# Patient Record
Sex: FEMALE | Race: BLACK OR AFRICAN AMERICAN | NOT HISPANIC OR LATINO | Employment: FULL TIME | URBAN - METROPOLITAN AREA
[De-identification: names, ages, dates, MRNs, and addresses within clinical notes are randomized per-mention and may not be internally consistent; named-entity substitution may affect disease eponyms.]

---

## 2022-09-22 LAB — PAP RECOMMENDATION EXT: NORMAL

## 2023-05-02 ENCOUNTER — OFFICE VISIT (OUTPATIENT)
Dept: FAMILY MEDICINE | Facility: CLINIC | Age: 43
End: 2023-05-02
Payer: COMMERCIAL

## 2023-05-02 ENCOUNTER — PATIENT MESSAGE (OUTPATIENT)
Dept: FAMILY MEDICINE | Facility: CLINIC | Age: 43
End: 2023-05-02

## 2023-05-02 VITALS
RESPIRATION RATE: 18 BRPM | HEIGHT: 64 IN | WEIGHT: 171.38 LBS | HEART RATE: 99 BPM | OXYGEN SATURATION: 98 % | SYSTOLIC BLOOD PRESSURE: 123 MMHG | BODY MASS INDEX: 29.26 KG/M2 | DIASTOLIC BLOOD PRESSURE: 78 MMHG

## 2023-05-02 DIAGNOSIS — Z00.00 WELLNESS EXAMINATION: Primary | ICD-10-CM

## 2023-05-02 DIAGNOSIS — D64.9 ANEMIA, UNSPECIFIED TYPE: ICD-10-CM

## 2023-05-02 DIAGNOSIS — E05.90 HYPERTHYROIDISM: ICD-10-CM

## 2023-05-02 DIAGNOSIS — Z12.31 VISIT FOR SCREENING MAMMOGRAM: ICD-10-CM

## 2023-05-02 LAB — HBA1C MFR BLD: 5.6 % (ref 4–6)

## 2023-05-02 PROCEDURE — 1160F RVW MEDS BY RX/DR IN RCRD: CPT | Mod: CPTII,,, | Performed by: FAMILY MEDICINE

## 2023-05-02 PROCEDURE — 3078F PR MOST RECENT DIASTOLIC BLOOD PRESSURE < 80 MM HG: ICD-10-PCS | Mod: CPTII,,, | Performed by: FAMILY MEDICINE

## 2023-05-02 PROCEDURE — 3008F PR BODY MASS INDEX (BMI) DOCUMENTED: ICD-10-PCS | Mod: CPTII,,, | Performed by: FAMILY MEDICINE

## 2023-05-02 PROCEDURE — 99212 PR OFFICE/OUTPT VISIT, EST, LEVL II, 10-19 MIN: ICD-10-PCS | Mod: 25,,, | Performed by: FAMILY MEDICINE

## 2023-05-02 PROCEDURE — 1159F MED LIST DOCD IN RCRD: CPT | Mod: CPTII,,, | Performed by: FAMILY MEDICINE

## 2023-05-02 PROCEDURE — 1159F PR MEDICATION LIST DOCUMENTED IN MEDICAL RECORD: ICD-10-PCS | Mod: CPTII,,, | Performed by: FAMILY MEDICINE

## 2023-05-02 PROCEDURE — 99386 PREV VISIT NEW AGE 40-64: CPT | Mod: ,,, | Performed by: FAMILY MEDICINE

## 2023-05-02 PROCEDURE — 3074F PR MOST RECENT SYSTOLIC BLOOD PRESSURE < 130 MM HG: ICD-10-PCS | Mod: CPTII,,, | Performed by: FAMILY MEDICINE

## 2023-05-02 PROCEDURE — 3074F SYST BP LT 130 MM HG: CPT | Mod: CPTII,,, | Performed by: FAMILY MEDICINE

## 2023-05-02 PROCEDURE — 99212 OFFICE O/P EST SF 10 MIN: CPT | Mod: 25,,, | Performed by: FAMILY MEDICINE

## 2023-05-02 PROCEDURE — 3078F DIAST BP <80 MM HG: CPT | Mod: CPTII,,, | Performed by: FAMILY MEDICINE

## 2023-05-02 PROCEDURE — 99386 PR PREVENTIVE VISIT,NEW,40-64: ICD-10-PCS | Mod: ,,, | Performed by: FAMILY MEDICINE

## 2023-05-02 PROCEDURE — 1160F PR REVIEW ALL MEDS BY PRESCRIBER/CLIN PHARMACIST DOCUMENTED: ICD-10-PCS | Mod: CPTII,,, | Performed by: FAMILY MEDICINE

## 2023-05-02 PROCEDURE — 3008F BODY MASS INDEX DOCD: CPT | Mod: CPTII,,, | Performed by: FAMILY MEDICINE

## 2023-05-02 RX ORDER — DICLOFENAC SODIUM 75 MG/1
75 TABLET, DELAYED RELEASE ORAL 2 TIMES DAILY
COMMUNITY
Start: 2023-01-18 | End: 2024-02-21

## 2023-05-02 RX ORDER — METHIMAZOLE 5 MG/1
7.5 TABLET ORAL DAILY
COMMUNITY
Start: 2023-04-14

## 2023-05-02 RX ORDER — CYCLOBENZAPRINE HCL 10 MG
10 TABLET ORAL 2 TIMES DAILY PRN
COMMUNITY
Start: 2022-10-20

## 2023-05-02 RX ORDER — TIZANIDINE 2 MG/1
2 TABLET ORAL 2 TIMES DAILY
COMMUNITY
Start: 2023-04-24

## 2023-05-02 RX ORDER — TERBINAFINE HYDROCHLORIDE 250 MG/1
250 TABLET ORAL DAILY
COMMUNITY
Start: 2023-04-13

## 2023-05-02 RX ORDER — NYSTATIN 100000 U/G
30 CREAM TOPICAL 2 TIMES DAILY
COMMUNITY
Start: 2023-04-13

## 2023-05-02 RX ORDER — CLINDAMYCIN PHOSPHATE AND BENZOYL PEROXIDE 10; 50 MG/G; MG/G
45 GEL TOPICAL DAILY
COMMUNITY
Start: 2023-04-13

## 2023-05-02 NOTE — PROGRESS NOTES
Patient ID: [id number]     Chief Complaint: Anemia        HPI:     Doreen Dorman is a 42 y.o. female here today for annual wellness exam, new patient, was seeing Dr. Sheela Ross.  The patient presents for well adult exam.    The patient's general health status is described as good.    The patient's diet is described as balanced.    Exercise: occasional.    Associated symptoms consist of denies weight loss, denies weight gain, denies fatigue, denies headache, denies hearing loss and denies vision changes.    Additional pertinent history: last dental exam: 12/2022, last eye exam: 12/2022 (wears contacts).  LMP: 04/15/2023, regular  Last pap smear: 11/2022 with GYN (Dr. Sharon Ross)  - She needs MMG scheduled.   - She would like HIV and Hep C screening.  - She is due for annual labs today.   - She has hyperthyroidism, asymptomatic with Rx, followed by Endocrinology (Dr. Vilchis).   - She has chronic neck and back pain, stable, followed by Neurosurgery (Dr. Andrews).  - She reports anemia x 4-5 years, hasn't had any workup. She denies bleeding. She has tried iron rich diet without success, she denies fatigue with anemia. She denies family history of GI cancer. She does have a history of hemorrhoids followed by GI (Dr. Cantu).   - Patient is without any other complaints today.       Advance Care Planning     Date: 05/02/2023  Patient did not wish or was not able to name a surrogate decision maker or provide an Advance Care Plan.        ----------------------------  Hyperthyroidism     Past Surgical History:   Procedure Laterality Date    CERVICAL SPINE SURGERY N/A 08/11/2022       Review of patient's allergies indicates:  No Known Allergies    Outpatient Medications Marked as Taking for the 5/2/23 encounter (Office Visit) with Thelma Dougherty MD   Medication Sig Dispense Refill    clindamycin-benzoyl peroxide gel Apply 45 g topically once daily.      cyclobenzaprine (FLEXERIL) 10 MG tablet Take  10 mg by mouth 2 (two) times daily as needed.      diclofenac (VOLTAREN) 75 MG EC tablet Take 75 mg by mouth 2 (two) times daily.      methIMAzole (TAPAZOLE) 5 MG Tab Take 7.5 mg by mouth once daily.      nystatin (MYCOSTATIN) cream Apply 30 g topically 2 (two) times daily.      terbinafine HCL (LAMISIL) 250 mg tablet Take 250 mg by mouth once daily.      tiZANidine (ZANAFLEX) 2 MG tablet Take 2 mg by mouth 2 (two) times daily.         Social History     Socioeconomic History    Marital status: Single   Tobacco Use    Smoking status: Never    Smokeless tobacco: Never   Substance and Sexual Activity    Alcohol use: Not Currently    Drug use: Never    Sexual activity: Yes     Partners: Male        History reviewed. No pertinent family history.     Subjective:       Review of Systems:    See HPI for details    Constitutional: Denies Change in appetite. Denies Chills. Denies Fever. Denies Night sweats.  Eye: Denies Blurred vision. Denies Discharge. Denies Eye pain.  ENT: Denies Decreased hearing. Denies Sore throat. Denies Swollen glands.  Respiratory: Denies Cough. Denies Shortness of breath. Denies Shortness of breath with exertion. Denies Wheezing.  Cardiovascular: Denies Chest pain at rest. Denies Chest pain with exertion. Denies Irregular heartbeat. Denies Palpitations.  Gastrointestinal: Denies Abdominal pain. Denies Diarrhea. Denies Nausea. Denies Vomiting. Denies Hematemesis or Hematochezia.  Genitourinary: Denies Dysuria. Denies Urinary frequency. Denies Urinary urgency. Denies Blood in urine.  Endocrine: Denies Cold intolerance. Denies Excessive thirst. Denies Heat intolerance. Denies Weight loss. Denies Weight gain.  Musculoskeletal: Denies Painful joints. Denies Weakness.  Integumentary: Denies Rash. Denies Itching. Denies Dry skin.  Neurologic: Denies Dizziness. Denies Fainting. Denies Headache.  Psychiatric: Denies Depression. Denies Anxiety. Denies Suicidal/Homicidal ideations.    All Other ROS: Negative  "except as stated in HPI.       Objective:     /78 (BP Location: Right arm, Patient Position: Sitting, BP Method: Medium (Automatic))   Pulse 99   Resp 18   Ht 5' 4" (1.626 m)   Wt 77.7 kg (171 lb 6.4 oz)   SpO2 98%   BMI 29.42 kg/m²     Physical Exam    General: Alert and oriented, No acute distress.  Head: Normocephalic, Atraumatic.  Eye: Pupils are equal, round and reactive to light, Extraocular movements are intact, Sclera non-icteric.  Ears/Nose/Throat: Normal, Mucosa moist,Clear.  Neck/Thyroid: Supple, Non-tender, No carotid bruit, No palpable thyromegaly or thyroid nodule, No lymphadenopathy, No JVD, Full range of motion.  Respiratory: Clear to auscultation bilaterally; No wheezes, rales or rhonchi,Non-labored respirations, Symmetrical chest wall expansion.  Cardiovascular: Regular rate and rhythm, S1/S2 normal, No murmurs, rubs or gallops.  Gastrointestinal: Soft, Non-tender, Non-distended, Normal bowel sounds, No palpable organomegaly.  Musculoskeletal: Normal range of motion.  Integumentary: Warm, Dry, Intact, No suspicious lesions or rashes.  Extremities: No clubbing, cyanosis or edema  Neurologic: No focal deficits, Cranial Nerves II-XII are grossly intact, Motor strength normal upper and lower extremities, Sensory exam intact.  Psychiatric: Normal interaction, Coherent speech, Euthymic mood, Appropriate affect         Assessment:       ICD-10-CM ICD-9-CM   1. Wellness examination  Z00.00 V70.0   2. Visit for screening mammogram  Z12.31 V76.12   3. Hyperthyroidism  E05.90 242.90   4. Anemia, unspecified type  D64.9 285.9        Plan:     Problem List Items Addressed This Visit    None  Visit Diagnoses       Wellness examination    -  Primary    Relevant Orders    Hepatitis C Antibody    HIV 1/2 Ag/Ab (4th Gen)    CBC Auto Differential    Comprehensive Metabolic Panel    Hemoglobin A1C    Lipid Panel    Iron and TIBC    TSH    Urinalysis, Reflex to Urine Culture    Visit for screening mammogram "        Relevant Orders    Mammo Digital Screening Bilat w/ Tommy    Hyperthyroidism        Anemia, unspecified type        Relevant Orders    CBC Auto Differential    Iron and TIBC    Vitamin B12    Hemoglobin Electrophorsis Evaluation, Blood         1. Wellness examination  - Hepatitis C Antibody; Future  - HIV 1/2 Ag/Ab (4th Gen); Future  - CBC Auto Differential; Future  - Comprehensive Metabolic Panel; Future  - Hemoglobin A1C; Future  - Lipid Panel; Future  - Iron and TIBC; Future  - TSH; Future  - Urinalysis, Reflex to Urine Culture; Future  - Will treat pending lab results. Monthly breast self exam encouraged. Diet, exercise, and 10% weight loss encouraged. Keep appointment for dental exams x q6 months as scheduled. Keep appointment for annual eye exam as scheduled. Keep appointment with GYN for annual pap smear as scheduled. Keep appointment with specialists as scheduled. Notify M.D. or ER if temp greater than 100.4, or any acute illness.      2. Visit for screening mammogram  - Mammo Digital Screening Bilat w/ Tommy; Future    3. Hyperthyroidism  - Asymptomatic, continue current treatment plan. Continue followup with Endocrinology as scheduled.     4. Anemia, unspecified type  - CBC Auto Differential; Future  - Iron and TIBC; Future  - Vitamin B12; Future  - Hemoglobin Electrophorsis Evaluation, Blood; Future  - If workup is normal or inconclusive, will proceed with US Pelvis, Referral for Colonoscopy and Hematology Referral. Continue iron rich diet. Notify M.D. or ER if symptoms persist or worsen, active bleeding, temp >100.4, or any acute illness.        Doreen was seen today for anemia.    Diagnoses and all orders for this visit:    Wellness examination  -     Hepatitis C Antibody; Future  -     HIV 1/2 Ag/Ab (4th Gen); Future  -     CBC Auto Differential; Future  -     Comprehensive Metabolic Panel; Future  -     Hemoglobin A1C; Future  -     Lipid Panel; Future  -     Iron and TIBC; Future  -     TSH;  Future  -     Urinalysis, Reflex to Urine Culture; Future    Visit for screening mammogram  -     Mammo Digital Screening Bilat w/ Tommy; Future    Hyperthyroidism    Anemia, unspecified type  -     CBC Auto Differential; Future  -     Iron and TIBC; Future  -     Vitamin B12; Future  -     Hemoglobin Electrophorsis Evaluation, Blood; Future          Medication List with Changes/Refills   Current Medications    CLINDAMYCIN-BENZOYL PEROXIDE GEL    Apply 45 g topically once daily.       Start Date: 4/13/2023 End Date: --    CYCLOBENZAPRINE (FLEXERIL) 10 MG TABLET    Take 10 mg by mouth 2 (two) times daily as needed.       Start Date: 10/20/2022End Date: --    DICLOFENAC (VOLTAREN) 75 MG EC TABLET    Take 75 mg by mouth 2 (two) times daily.       Start Date: 1/18/2023 End Date: --    METHIMAZOLE (TAPAZOLE) 5 MG TAB    Take 7.5 mg by mouth once daily.       Start Date: 4/14/2023 End Date: --    NYSTATIN (MYCOSTATIN) CREAM    Apply 30 g topically 2 (two) times daily.       Start Date: 4/13/2023 End Date: --    TERBINAFINE HCL (LAMISIL) 250 MG TABLET    Take 250 mg by mouth once daily.       Start Date: 4/13/2023 End Date: --    TIZANIDINE (ZANAFLEX) 2 MG TABLET    Take 2 mg by mouth 2 (two) times daily.       Start Date: 4/24/2023 End Date: --          Follow up in about 3 months (around 8/2/2023) for Anemia Followup, Virtual Visit; *Pap smear from GYN (Dr. Ross)*.

## 2023-05-09 ENCOUNTER — DOCUMENTATION ONLY (OUTPATIENT)
Dept: FAMILY MEDICINE | Facility: CLINIC | Age: 43
End: 2023-05-09
Payer: COMMERCIAL

## 2023-05-11 ENCOUNTER — PATIENT MESSAGE (OUTPATIENT)
Dept: FAMILY MEDICINE | Facility: CLINIC | Age: 43
End: 2023-05-11
Payer: COMMERCIAL

## 2023-05-12 ENCOUNTER — DOCUMENTATION ONLY (OUTPATIENT)
Dept: FAMILY MEDICINE | Facility: CLINIC | Age: 43
End: 2023-05-12
Payer: COMMERCIAL

## 2023-05-12 DIAGNOSIS — D69.6 LOW PLATELET COUNT: ICD-10-CM

## 2023-05-12 DIAGNOSIS — D50.9 IRON DEFICIENCY ANEMIA, UNSPECIFIED IRON DEFICIENCY ANEMIA TYPE: Primary | ICD-10-CM

## 2023-05-12 RX ORDER — FERROUS GLUCONATE 324(38)MG
324 TABLET ORAL 2 TIMES DAILY WITH MEALS
Qty: 60 TABLET | Refills: 1 | Status: SHIPPED | OUTPATIENT
Start: 2023-05-12 | End: 2023-07-11

## 2023-05-15 ENCOUNTER — TELEPHONE (OUTPATIENT)
Dept: FAMILY MEDICINE | Facility: CLINIC | Age: 43
End: 2023-05-15
Payer: COMMERCIAL

## 2023-05-15 NOTE — TELEPHONE ENCOUNTER
----- Message from Thelma Dougherty MD sent at 5/12/2023  2:36 PM CDT -----  Patient is anemic with low platelet count and low iron, needs iron rich diet and Rx iron x bid sent, recheck CBC and iron level in 4-6 weeks, orders are in file, Hemoglobin electrophoresis is normal. Order for US pelvis is in file to evaluate for possible GYN source of anemia and GI referral for Colonoscopy is in file for evaluation of possible gastrointestinal source of anemia. If US pelvis and Colonoscopy are normal, will proceed with Hematology referral. HIV and Hepatitis C testing are negative. Remaining labs are essentially normal.

## 2023-05-17 ENCOUNTER — HOSPITAL ENCOUNTER (OUTPATIENT)
Dept: RADIOLOGY | Facility: HOSPITAL | Age: 43
Discharge: HOME | End: 2023-05-17
Attending: FAMILY MEDICINE
Payer: COMMERCIAL

## 2023-05-17 DIAGNOSIS — Z12.31 VISIT FOR SCREENING MAMMOGRAM: ICD-10-CM

## 2023-05-17 PROCEDURE — 77063 MAMMO DIGITAL SCREENING BILAT WITH TOMO: ICD-10-PCS | Mod: 26,,, | Performed by: RADIOLOGY

## 2023-05-17 PROCEDURE — 77067 MAMMO DIGITAL SCREENING BILAT WITH TOMO: ICD-10-PCS | Mod: 26,,, | Performed by: RADIOLOGY

## 2023-05-17 PROCEDURE — 77067 SCR MAMMO BI INCL CAD: CPT | Mod: 26,,, | Performed by: RADIOLOGY

## 2023-05-17 PROCEDURE — 77063 BREAST TOMOSYNTHESIS BI: CPT | Mod: 26,,, | Performed by: RADIOLOGY

## 2023-05-17 PROCEDURE — 77067 SCR MAMMO BI INCL CAD: CPT | Mod: TC

## 2023-05-19 DIAGNOSIS — R92.8 ABNORMAL MAMMOGRAM: Primary | ICD-10-CM

## 2023-05-19 NOTE — PROGRESS NOTES
Abnormal mammogram.   Needs further imaging.     RECOMMENDATIONS:   Recommend a bilateral diagnostic mammogram (to include spot compression tomosynthesis views) and possible bilateral breast ultrasound if needed.     [facility] staff will contact the patient to schedule these additional imaging studies.         Thanks,    Dr. Moore

## 2023-05-19 NOTE — PROGRESS NOTES
Abnormal mammogram. Needs further imaging. Orders are in computer  Please ensure this has been set up with the breast center.    Thanks,    Dr. Moore

## 2023-05-22 ENCOUNTER — PATIENT MESSAGE (OUTPATIENT)
Dept: FAMILY MEDICINE | Facility: CLINIC | Age: 43
End: 2023-05-22
Payer: COMMERCIAL

## 2023-05-23 ENCOUNTER — HOSPITAL ENCOUNTER (OUTPATIENT)
Dept: RADIOLOGY | Facility: HOSPITAL | Age: 43
Discharge: HOME | End: 2023-05-23
Attending: STUDENT IN AN ORGANIZED HEALTH CARE EDUCATION/TRAINING PROGRAM
Payer: COMMERCIAL

## 2023-05-23 DIAGNOSIS — R92.8 ABNORMAL MAMMOGRAM: ICD-10-CM

## 2023-05-23 DIAGNOSIS — N63.20 MASS OF LEFT BREAST, UNSPECIFIED QUADRANT: Primary | ICD-10-CM

## 2023-05-23 PROCEDURE — 77062 MAMMO DIGITAL DIAGNOSTIC BILAT WITH TOMO: ICD-10-PCS | Mod: 26,,, | Performed by: RADIOLOGY

## 2023-05-23 PROCEDURE — 77062 BREAST TOMOSYNTHESIS BI: CPT | Mod: 26,,, | Performed by: RADIOLOGY

## 2023-05-23 PROCEDURE — 76641 ULTRASOUND BREAST COMPLETE: CPT | Mod: TC,50

## 2023-05-23 PROCEDURE — 77066 MAMMO DIGITAL DIAGNOSTIC BILAT WITH TOMO: ICD-10-PCS | Mod: 26,,, | Performed by: RADIOLOGY

## 2023-05-23 PROCEDURE — 76641 US BREAST BILATERAL COMPLETE: ICD-10-PCS | Mod: 26,,, | Performed by: RADIOLOGY

## 2023-05-23 PROCEDURE — 77066 DX MAMMO INCL CAD BI: CPT | Mod: 26,,, | Performed by: RADIOLOGY

## 2023-05-23 PROCEDURE — 77066 DX MAMMO INCL CAD BI: CPT | Mod: TC

## 2023-05-23 PROCEDURE — 76641 ULTRASOUND BREAST COMPLETE: CPT | Mod: 26,,, | Performed by: RADIOLOGY

## 2023-05-24 ENCOUNTER — TELEPHONE (OUTPATIENT)
Dept: FAMILY MEDICINE | Facility: CLINIC | Age: 43
End: 2023-05-24
Payer: COMMERCIAL

## 2023-05-24 NOTE — TELEPHONE ENCOUNTER
Attempted to speak to patient about mammogram results, pt reported that she had already spoken to to those who performed the mammogram about them and was given the instructions on a follow up. I voiced my understanding.

## 2023-05-24 NOTE — TELEPHONE ENCOUNTER
[facility] in regards to message put in for patient's labs. Needed more clarification on what the message meant. Was told they need her lab results.    At the time, did not see them (my mistake, they were listed under lab report) and called the patient to inquire about them and where she got them done just incase I needed to request the lab results. However, when I went in the chart, I saw that they were in there. Voiced apologies to patient. Patient voiced understanding.     Will fax over lab results to [facility]

## 2023-05-26 ENCOUNTER — TELEPHONE (OUTPATIENT)
Dept: FAMILY MEDICINE | Facility: CLINIC | Age: 43
End: 2023-05-26
Payer: COMMERCIAL

## 2023-05-26 NOTE — TELEPHONE ENCOUNTER
Attempted to call patient about results on US Pelvis. NA, Detailed message left explaining result to patient. Encouraged the patient to call us back if they have any further questions, comments, or concerns.

## 2023-05-26 NOTE — TELEPHONE ENCOUNTER
----- Message from Thelma Dougherty MD sent at 5/26/2023 11:32 AM CDT -----  US Pelvis shows two physiologic right ovarian cysts, the larger measuring 2.7 cm in greatest dimension.  Single small physiologic left ovarian cyst. Please fax results to her GYN (Dr. Ross) for review and treatment. Remaining US Pelvis is normal.

## 2023-06-29 ENCOUNTER — HOSPITAL ENCOUNTER (OUTPATIENT)
Dept: RADIOLOGY | Facility: HOSPITAL | Age: 43
Discharge: HOME | End: 2023-06-29
Attending: STUDENT IN AN ORGANIZED HEALTH CARE EDUCATION/TRAINING PROGRAM
Payer: COMMERCIAL

## 2023-06-29 VITALS — BODY MASS INDEX: 29.19 KG/M2 | HEIGHT: 64 IN | WEIGHT: 171 LBS

## 2023-06-29 DIAGNOSIS — R92.8 ABNORMAL MAMMOGRAM: ICD-10-CM

## 2023-06-29 DIAGNOSIS — N63.20 MASS OF LEFT BREAST, UNSPECIFIED QUADRANT: Primary | ICD-10-CM

## 2023-06-29 PROCEDURE — 77065 MAMMO DIGITAL DIAGNOSTIC LEFT WITH TOMO: ICD-10-PCS | Mod: 26,LT,, | Performed by: RADIOLOGY

## 2023-06-29 PROCEDURE — 76642 ULTRASOUND BREAST LIMITED: CPT | Mod: 26,LT,, | Performed by: RADIOLOGY

## 2023-06-29 PROCEDURE — 76642 US BREAST LEFT LIMITED: ICD-10-PCS | Mod: 26,LT,, | Performed by: RADIOLOGY

## 2023-06-29 PROCEDURE — 77061 BREAST TOMOSYNTHESIS UNI: CPT | Mod: TC,LT

## 2023-06-29 PROCEDURE — 77061 MAMMO DIGITAL DIAGNOSTIC LEFT WITH TOMO: ICD-10-PCS | Mod: 26,LT,, | Performed by: RADIOLOGY

## 2023-06-29 PROCEDURE — 77065 DX MAMMO INCL CAD UNI: CPT | Mod: 26,LT,, | Performed by: RADIOLOGY

## 2023-06-29 PROCEDURE — 76642 ULTRASOUND BREAST LIMITED: CPT | Mod: TC,LT

## 2023-06-29 PROCEDURE — 77061 BREAST TOMOSYNTHESIS UNI: CPT | Mod: 26,LT,, | Performed by: RADIOLOGY

## 2023-07-17 ENCOUNTER — HOSPITAL ENCOUNTER (OUTPATIENT)
Dept: RADIOLOGY | Facility: HOSPITAL | Age: 43
Discharge: HOME | End: 2023-07-17
Attending: STUDENT IN AN ORGANIZED HEALTH CARE EDUCATION/TRAINING PROGRAM
Payer: COMMERCIAL

## 2023-07-17 DIAGNOSIS — R92.8 ABNORMAL MAMMOGRAM: ICD-10-CM

## 2023-07-17 DIAGNOSIS — R92.8 ABNORMAL MAMMOGRAM: Primary | ICD-10-CM

## 2023-07-17 PROCEDURE — 19083 US BREAST BIOPSY WITH IMAGING 1ST SITE LEFT: ICD-10-PCS | Mod: LT,,, | Performed by: RADIOLOGY

## 2023-07-17 PROCEDURE — 77065 DX MAMMO INCL CAD UNI: CPT | Mod: 26,LT,, | Performed by: RADIOLOGY

## 2023-07-17 PROCEDURE — 19083 BX BREAST 1ST LESION US IMAG: CPT | Mod: LT,,, | Performed by: RADIOLOGY

## 2023-07-17 PROCEDURE — 77061 BREAST TOMOSYNTHESIS UNI: CPT | Mod: 26,LT,, | Performed by: RADIOLOGY

## 2023-07-17 PROCEDURE — 77061 BREAST TOMOSYNTHESIS UNI: CPT | Mod: TC,LT

## 2023-07-17 PROCEDURE — 19083 BX BREAST 1ST LESION US IMAG: CPT | Mod: LT

## 2023-07-17 PROCEDURE — 77061 MAMMO DIGITAL DIAGNOSTIC LEFT WITH TOMO: ICD-10-PCS | Mod: 26,LT,, | Performed by: RADIOLOGY

## 2023-07-17 PROCEDURE — 77065 MAMMO DIGITAL DIAGNOSTIC LEFT WITH TOMO: ICD-10-PCS | Mod: 26,LT,, | Performed by: RADIOLOGY

## 2023-07-18 LAB — PSYCHE PATHOLOGY RESULT: NORMAL

## 2023-07-24 ENCOUNTER — TELEPHONE (OUTPATIENT)
Dept: FAMILY MEDICINE | Facility: CLINIC | Age: 43
End: 2023-07-24
Payer: COMMERCIAL

## 2023-07-24 DIAGNOSIS — N63.20 MASS OF LEFT BREAST, UNSPECIFIED QUADRANT: Primary | ICD-10-CM

## 2023-07-24 NOTE — TELEPHONE ENCOUNTER
----- Message from Thelma Dougherty MD sent at 7/24/2023  2:59 PM CDT -----  Breast imaging shows benign findings, a followup left diagnostic mammogram with left breast US is recommended in 01/2024 to ensure stability, orders are in file, [facility] breast center staff will contact the patient to schedule.

## 2023-07-24 NOTE — TELEPHONE ENCOUNTER
----- Message from Thelma Dougherty MD sent at 7/24/2023  2:58 PM CDT -----  Breast imaging shows benign findings, a followup left diagnostic mammogram with left breast US is recommended in 01/2024 to ensure stability, orders are in file, [facility] breast center staff will contact the patient to schedule.

## 2023-08-09 ENCOUNTER — PATIENT MESSAGE (OUTPATIENT)
Dept: FAMILY MEDICINE | Facility: CLINIC | Age: 43
End: 2023-08-09
Payer: COMMERCIAL

## 2023-08-10 ENCOUNTER — LAB VISIT (OUTPATIENT)
Dept: LAB | Facility: HOSPITAL | Age: 43
End: 2023-08-10
Attending: FAMILY MEDICINE
Payer: COMMERCIAL

## 2023-08-10 DIAGNOSIS — D64.9 ANEMIA, UNSPECIFIED TYPE: ICD-10-CM

## 2023-08-10 DIAGNOSIS — Z00.00 WELLNESS EXAMINATION: ICD-10-CM

## 2023-08-10 LAB
ALBUMIN SERPL-MCNC: 3.9 G/DL (ref 3.5–5)
ALBUMIN/GLOB SERPL: 1.4 RATIO (ref 1.1–2)
ALP SERPL-CCNC: 58 UNIT/L (ref 40–150)
ALT SERPL-CCNC: 14 UNIT/L (ref 0–55)
APPEARANCE UR: CLEAR
AST SERPL-CCNC: 17 UNIT/L (ref 5–34)
BACTERIA #/AREA URNS AUTO: ABNORMAL /HPF
BASOPHILS # BLD AUTO: 0.04 X10(3)/MCL
BASOPHILS NFR BLD AUTO: 0.8 %
BILIRUB SERPL-MCNC: 0.6 MG/DL
BILIRUB UR QL STRIP.AUTO: NEGATIVE
BUN SERPL-MCNC: 7.3 MG/DL (ref 7–18.7)
CALCIUM SERPL-MCNC: 9.7 MG/DL (ref 8.4–10.2)
CHLORIDE SERPL-SCNC: 105 MMOL/L (ref 98–107)
CHOLEST SERPL-MCNC: 192 MG/DL
CHOLEST/HDLC SERPL: 4 {RATIO} (ref 0–5)
CO2 SERPL-SCNC: 29 MMOL/L (ref 22–29)
COLOR UR: ABNORMAL
CREAT SERPL-MCNC: 0.78 MG/DL (ref 0.55–1.02)
EOSINOPHIL # BLD AUTO: 0.13 X10(3)/MCL (ref 0–0.9)
EOSINOPHIL NFR BLD AUTO: 2.5 %
ERYTHROCYTE [DISTWIDTH] IN BLOOD BY AUTOMATED COUNT: 16.6 % (ref 11.5–17)
EST. AVERAGE GLUCOSE BLD GHB EST-MCNC: 102.5 MG/DL
GFR SERPLBLD CREATININE-BSD FMLA CKD-EPI: >60 MLS/MIN/1.73/M2
GLOBULIN SER-MCNC: 2.8 GM/DL (ref 2.4–3.5)
GLUCOSE SERPL-MCNC: 99 MG/DL (ref 74–100)
GLUCOSE UR QL STRIP.AUTO: NEGATIVE
HBA1C MFR BLD: 5.2 %
HCT VFR BLD AUTO: 36.9 % (ref 37–47)
HCV AB SERPL QL IA: NONREACTIVE
HDLC SERPL-MCNC: 44 MG/DL (ref 35–60)
HGB BLD-MCNC: 11.7 G/DL (ref 12–16)
HIV 1+2 AB+HIV1 P24 AG SERPL QL IA: NONREACTIVE
IMM GRANULOCYTES # BLD AUTO: 0.05 X10(3)/MCL (ref 0–0.04)
IMM GRANULOCYTES NFR BLD AUTO: 1 %
IRON SATN MFR SERPL: 14 % (ref 20–50)
IRON SERPL-MCNC: 46 UG/DL (ref 50–170)
KETONES UR QL STRIP.AUTO: ABNORMAL
LDLC SERPL CALC-MCNC: 136 MG/DL (ref 50–140)
LEUKOCYTE ESTERASE UR QL STRIP.AUTO: NEGATIVE
LYMPHOCYTES # BLD AUTO: 1.37 X10(3)/MCL (ref 0.6–4.6)
LYMPHOCYTES NFR BLD AUTO: 26.2 %
MCH RBC QN AUTO: 26.7 PG (ref 27–31)
MCHC RBC AUTO-ENTMCNC: 31.7 G/DL (ref 33–36)
MCV RBC AUTO: 84.1 FL (ref 80–94)
MONOCYTES # BLD AUTO: 0.36 X10(3)/MCL (ref 0.1–1.3)
MONOCYTES NFR BLD AUTO: 6.9 %
NEUTROPHILS # BLD AUTO: 3.27 X10(3)/MCL (ref 2.1–9.2)
NEUTROPHILS NFR BLD AUTO: 62.6 %
NITRITE UR QL STRIP.AUTO: NEGATIVE
NRBC BLD AUTO-RTO: 0 %
PH UR STRIP.AUTO: 6 [PH]
PLATELET # BLD AUTO: 117 X10(3)/MCL (ref 130–400)
PMV BLD AUTO: ABNORMAL FL
POTASSIUM SERPL-SCNC: 4.1 MMOL/L (ref 3.5–5.1)
PROT SERPL-MCNC: 6.7 GM/DL (ref 6.4–8.3)
PROT UR QL STRIP.AUTO: NEGATIVE
RBC # BLD AUTO: 4.39 X10(6)/MCL (ref 4.2–5.4)
RBC #/AREA URNS AUTO: <5 /HPF
RBC UR QL AUTO: ABNORMAL
SODIUM SERPL-SCNC: 141 MMOL/L (ref 136–145)
SP GR UR STRIP.AUTO: 1.03 (ref 1–1.03)
SQUAMOUS #/AREA URNS AUTO: <5 /HPF
TIBC SERPL-MCNC: 281 UG/DL (ref 70–310)
TIBC SERPL-MCNC: 327 UG/DL (ref 250–450)
TRANSFERRIN SERPL-MCNC: 297 MG/DL (ref 180–382)
TRIGL SERPL-MCNC: 60 MG/DL (ref 37–140)
TSH SERPL-ACNC: 0.95 UIU/ML (ref 0.35–4.94)
UROBILINOGEN UR STRIP-ACNC: 1
VIT B12 SERPL-MCNC: 1015 PG/ML (ref 213–816)
VLDLC SERPL CALC-MCNC: 12 MG/DL
WBC # SPEC AUTO: 5.22 X10(3)/MCL (ref 4.5–11.5)
WBC #/AREA URNS AUTO: <5 /HPF

## 2023-08-10 PROCEDURE — 83036 HEMOGLOBIN GLYCOSYLATED A1C: CPT

## 2023-08-10 PROCEDURE — 87389 HIV-1 AG W/HIV-1&-2 AB AG IA: CPT

## 2023-08-10 PROCEDURE — 83020 HEMOGLOBIN ELECTROPHORESIS: CPT

## 2023-08-10 PROCEDURE — 83550 IRON BINDING TEST: CPT

## 2023-08-10 PROCEDURE — 85025 COMPLETE CBC W/AUTO DIFF WBC: CPT

## 2023-08-10 PROCEDURE — 86803 HEPATITIS C AB TEST: CPT

## 2023-08-10 PROCEDURE — 36415 COLL VENOUS BLD VENIPUNCTURE: CPT

## 2023-08-10 PROCEDURE — 81001 URINALYSIS AUTO W/SCOPE: CPT

## 2023-08-10 PROCEDURE — 82607 VITAMIN B-12: CPT

## 2023-08-10 PROCEDURE — 84443 ASSAY THYROID STIM HORMONE: CPT

## 2023-08-10 PROCEDURE — 80061 LIPID PANEL: CPT

## 2023-08-10 PROCEDURE — 80053 COMPREHEN METABOLIC PANEL: CPT

## 2023-08-11 ENCOUNTER — OFFICE VISIT (OUTPATIENT)
Dept: FAMILY MEDICINE | Facility: CLINIC | Age: 43
End: 2023-08-11
Payer: COMMERCIAL

## 2023-08-11 VITALS — BODY MASS INDEX: 28.32 KG/M2 | WEIGHT: 165 LBS

## 2023-08-11 DIAGNOSIS — R42 DIZZINESS: ICD-10-CM

## 2023-08-11 DIAGNOSIS — R42 DIZZINESS AND GIDDINESS: ICD-10-CM

## 2023-08-11 DIAGNOSIS — M25.562 CHRONIC PAIN OF LEFT KNEE: ICD-10-CM

## 2023-08-11 DIAGNOSIS — D50.9 IRON DEFICIENCY ANEMIA, UNSPECIFIED IRON DEFICIENCY ANEMIA TYPE: Primary | ICD-10-CM

## 2023-08-11 DIAGNOSIS — G89.29 CHRONIC PAIN OF LEFT KNEE: ICD-10-CM

## 2023-08-11 DIAGNOSIS — R82.71 BACTERIA IN URINE: ICD-10-CM

## 2023-08-11 LAB
HGB A MFR BLD ELPH: 97.5 % (ref 95.8–98)
HGB A2 MFR BLD ELPH: 2.5 % (ref 2–3.3)
HGB F MFR BLD ELPH: 0 % (ref 0–0.9)
HGB FRACT BLD ELPH-IMP: NORMAL
M HPLC HB VARIANT, B: NORMAL

## 2023-08-11 PROCEDURE — 99214 PR OFFICE/OUTPT VISIT, EST, LEVL IV, 30-39 MIN: ICD-10-PCS | Mod: 95,,, | Performed by: FAMILY MEDICINE

## 2023-08-11 PROCEDURE — 3044F PR MOST RECENT HEMOGLOBIN A1C LEVEL <7.0%: ICD-10-PCS | Mod: CPTII,95,, | Performed by: FAMILY MEDICINE

## 2023-08-11 PROCEDURE — 1160F PR REVIEW ALL MEDS BY PRESCRIBER/CLIN PHARMACIST DOCUMENTED: ICD-10-PCS | Mod: CPTII,95,, | Performed by: FAMILY MEDICINE

## 2023-08-11 PROCEDURE — 3008F BODY MASS INDEX DOCD: CPT | Mod: CPTII,95,, | Performed by: FAMILY MEDICINE

## 2023-08-11 PROCEDURE — 3008F PR BODY MASS INDEX (BMI) DOCUMENTED: ICD-10-PCS | Mod: CPTII,95,, | Performed by: FAMILY MEDICINE

## 2023-08-11 PROCEDURE — 99214 OFFICE O/P EST MOD 30 MIN: CPT | Mod: 95,,, | Performed by: FAMILY MEDICINE

## 2023-08-11 PROCEDURE — 1159F MED LIST DOCD IN RCRD: CPT | Mod: CPTII,95,, | Performed by: FAMILY MEDICINE

## 2023-08-11 PROCEDURE — 1160F RVW MEDS BY RX/DR IN RCRD: CPT | Mod: CPTII,95,, | Performed by: FAMILY MEDICINE

## 2023-08-11 PROCEDURE — 3044F HG A1C LEVEL LT 7.0%: CPT | Mod: CPTII,95,, | Performed by: FAMILY MEDICINE

## 2023-08-11 PROCEDURE — 1159F PR MEDICATION LIST DOCUMENTED IN MEDICAL RECORD: ICD-10-PCS | Mod: CPTII,95,, | Performed by: FAMILY MEDICINE

## 2023-08-11 RX ORDER — FERROUS GLUCONATE 324(38)MG
324 TABLET ORAL 2 TIMES DAILY WITH MEALS
Qty: 180 TABLET | Refills: 0 | Status: SHIPPED | OUTPATIENT
Start: 2023-08-11 | End: 2023-11-13 | Stop reason: SDUPTHER

## 2023-08-11 NOTE — PROGRESS NOTES
Patient ID: [id number]     Chief Complaint: Anemia        HPI:     This is a telemedicine note. Patient was treated using telemedicine, real time audio and video, according to [facility] protocols. IThelma M.D. , conducted the visit from the [facility]. The patient participated in the visit at a non-[facility] location selected by the patient, identified below. I am licensed in the state where the patient stated they are located. The patient stated that they understood and accepted the privacy and security risks to their information at their location. This visit is not recorded.    Patient was located at the patient's home.     Doreen Dorman is a 43 y.o. female here today for a telemedicine visit.    - She is here for followup anemia x 4-5 years, she has been taking iron supplement x bid with improvement of symptoms. She is compliant with iron rich diet. She denies bleeding. She denies fatigue with anemia. She denies family history of GI cancer. She does have a history of hemorrhoids followed by GI (Dr. Cantu). She had labs done on 08/10/2023, here to discuss the results today. She has had negative workup with Hematology in the past.   - She complains of dizziness x 1 month. She reports some nausea, no vomiting. She is s/p C-spine surgery with Neurosurgery (Dr. Andrews) on 08/11/2022, she still has neck pain and has a CT neck scheduled on 09/15/2023 with Neurosurgery. She has headaches at this. She has dizziness often, she denies relation to positions.   - Patient complains of left posterior knee pain x 06/2023. She denies injury. Worse with going from sitting to standing. Pain is 7/10 on pain scale, sharp, she takes Diclofenac with some resolution of symptoms. She denies popping or swelling. She hasn't imaging or PT.   - Patient is without any other complaints today.           ----------------------------  Hyperthyroidism     Past Surgical History:   Procedure Laterality Date     CERVICAL SPINE SURGERY N/A 08/11/2022       Review of patient's allergies indicates:  No Known Allergies    Outpatient Medications Marked as Taking for the 8/11/23 encounter (Office Visit) with Thelma Dougherty MD   Medication Sig Dispense Refill    clindamycin-benzoyl peroxide gel Apply 45 g topically once daily.      cyclobenzaprine (FLEXERIL) 10 MG tablet Take 10 mg by mouth 2 (two) times daily as needed.      diclofenac (VOLTAREN) 75 MG EC tablet Take 75 mg by mouth 2 (two) times daily.      methIMAzole (TAPAZOLE) 5 MG Tab Take 7.5 mg by mouth once daily.      nystatin (MYCOSTATIN) cream Apply 30 g topically 2 (two) times daily.      terbinafine HCL (LAMISIL) 250 mg tablet Take 250 mg by mouth once daily.      tiZANidine (ZANAFLEX) 2 MG tablet Take 2 mg by mouth 2 (two) times daily.         Social History     Socioeconomic History    Marital status: Single   Tobacco Use    Smoking status: Never    Smokeless tobacco: Never   Substance and Sexual Activity    Alcohol use: Not Currently    Drug use: Never    Sexual activity: Yes     Partners: Male        Family History   Problem Relation Age of Onset    Breast cancer Maternal Aunt 45        maternal great - aunt        Subjective:       See HPI for details    Constitutional: Denies Change in appetite. Denies Chills. Denies Fever. Denies Night sweats.  Eye: Denies Blurred vision. Denies Discharge. Denies Eye pain.  ENT: Denies Decreased hearing. Denies Sore throat. Denies Swollen glands.  Respiratory: Denies Cough. Denies Shortness of breath. Denies Shortness of breath with exertion. Denies Wheezing.  Cardiovascular: Denies Chest pain at rest. Denies Chest pain with exertion. Denies Irregular heartbeat. Denies Palpitations.  Gastrointestinal: Denies Abdominal pain. Denies Diarrhea. Denies Nausea. Denies Vomiting. Denies Hematemesis or Hematochezia.  Genitourinary: Denies Dysuria. Denies Urinary frequency. Denies Urinary urgency. Denies Blood in  urine.  Endocrine: Denies Cold intolerance. Denies Excessive thirst. Denies Heat intolerance. Denies Weight loss. Denies Weight gain.  Musculoskeletal: Denies Painful joints. Denies Weakness.  Integumentary: Denies Rash. Denies Itching. Denies Dry skin.  Neurologic: Reports Dizziness. Denies Fainting. Denies Headache.  Psychiatric: Denies Depression. Denies Anxiety. Denies Suicidal/Homicidal ideations.    All Other ROS: Negative except as stated in HPI.   Answers submitted by the patient for this visit:  Review of Systems Questionnaire (Submitted on 8/10/2023)  activity change: No  unexpected weight change: Yes  neck pain: Yes  hearing loss: No  rhinorrhea: No  trouble swallowing: No  eye discharge: No  visual disturbance: No  chest tightness: No  wheezing: No  chest pain: No  palpitations: No  blood in stool: No  constipation: No  vomiting: No  diarrhea: No  polydipsia: No  polyuria: No  difficulty urinating: No  hematuria: No  menstrual problem: No  dysuria: No  joint swelling: Yes  arthralgias: Yes  headaches: Yes  weakness: Yes  confusion: Yes  dysphoric mood: No      Objective:     Wt 74.8 kg (165 lb)   LMP 08/01/2023 (Exact Date)   BMI 28.32 kg/m²     Physical Exam    Physical Exam: LIMITED DUE TO TELEMEDICINE RESTRICTIONS.  General: Alert and oriented, No acute distress.  Head: Normocephalic, Atraumatic.  Eye: Sclera non-icteric.  Neck/Thyroid:  Full range of motion.  Respiratory: Non-labored respirations, Symmetrical chest wall expansion.  Musculoskeletal: Normal range of motion.  Integumentary: Warm, Dry, Intact, No visible suspicious lesions or rashes. No diaphoresis.   Neurologic: No focal deficits  Psychiatric: Normal interaction, Coherent speech, Euthymic mood, Appropriate affect     *Lab results from 08/10/2023 were reviewed and discussed with patient and patient voices understanding.*    The 10-year ASCVD risk score (Dwaine DK, et al., 2019) is: 0.8%    Values used to calculate the score:      Age:  43 years      Sex: Female      Is Non- : Yes      Diabetic: No      Tobacco smoker: No      Systolic Blood Pressure: 123 mmHg      Is BP treated: No      HDL Cholesterol: 44 mg/dL      Total Cholesterol: 192 mg/dL     Assessment:       ICD-10-CM ICD-9-CM   1. Iron deficiency anemia, unspecified iron deficiency anemia type  D50.9 280.9   2. Dizziness  R42 780.4   3. Chronic pain of left knee  M25.562 719.46    G89.29 338.29   4. Bacteria in urine  R82.71 791.9   5. Dizziness and giddiness  R42 780.4        Plan:     Problem List Items Addressed This Visit          Oncology    Iron deficiency anemia - Primary    Relevant Medications    ferrous gluconate (FERGON) 324 MG tablet    Other Relevant Orders    CBC Auto Differential    Iron and TIBC     Other Visit Diagnoses       Dizziness        Relevant Orders    CT Head Without Contrast    Chronic pain of left knee        Relevant Orders    X-Ray Knee 1 or 2 View Left    Bacteria in urine        Relevant Orders    Urine Culture High Risk    Dizziness and giddiness        Relevant Orders    CT Head Without Contrast         1. Iron deficiency anemia, unspecified iron deficiency anemia type  - ferrous gluconate (FERGON) 324 MG tablet; Take 1 tablet (324 mg total) by mouth 2 (two) times daily with meals.  Dispense: 180 tablet; Refill: 0  - CBC Auto Differential; Future  - Iron and TIBC; Future  - Improving, continue Rx iron as directed. Recheck labs in 11/2023. Continue iron rich diet. Notify M.D. or ER if symptoms persist or worsen, active bleeding, temp >100.4, or any acute illness.      2. Dizziness  - CT Head Without Contrast; Future  - Will followup CT head results. Could be from neck as well, patient to keep appiointment for CT neck as scheduled by her Neurosurgery, but if symptoms persist and CT head is normal, will proceed with expediting CT neck and obtain Carotid imaging. Notify M.D. or ER if symptoms persist or worsen, temp >100.4, or any  acute illness.      3. Chronic pain of left knee  - X-Ray Knee 1 or 2 View Left; Future  - Continue stretching exercise and Voltaren p.r.n. as prescribed. If Xray is normal, will proceed with PT referral. Notify M.D. or ER if symptoms persist or worsen, temp >100.4, or any acute illness.      4. Bacteria in urine  - Urine Culture High Risk; Future  - Will treat pending urine culture results. UTI precautions encouraged.     5. Dizziness and giddiness  - CT Head Without Contrast; Future  - Same as #2.       Doreen was seen today for anemia.    Diagnoses and all orders for this visit:    Iron deficiency anemia, unspecified iron deficiency anemia type  -     ferrous gluconate (FERGON) 324 MG tablet; Take 1 tablet (324 mg total) by mouth 2 (two) times daily with meals.  -     CBC Auto Differential; Future  -     Iron and TIBC; Future    Dizziness  -     CT Head Without Contrast; Future    Chronic pain of left knee  -     X-Ray Knee 1 or 2 View Left; Future    Bacteria in urine  -     Urine Culture High Risk; Future    Dizziness and giddiness  -     CT Head Without Contrast; Future          Medication List with Changes/Refills   New Medications    FERROUS GLUCONATE (FERGON) 324 MG TABLET    Take 1 tablet (324 mg total) by mouth 2 (two) times daily with meals.       Start Date: 8/11/2023 End Date: 11/9/2023   Current Medications    CLINDAMYCIN-BENZOYL PEROXIDE GEL    Apply 45 g topically once daily.       Start Date: 4/13/2023 End Date: --    CYCLOBENZAPRINE (FLEXERIL) 10 MG TABLET    Take 10 mg by mouth 2 (two) times daily as needed.       Start Date: 10/20/2022End Date: --    DICLOFENAC (VOLTAREN) 75 MG EC TABLET    Take 75 mg by mouth 2 (two) times daily.       Start Date: 1/18/2023 End Date: --    METHIMAZOLE (TAPAZOLE) 5 MG TAB    Take 7.5 mg by mouth once daily.       Start Date: 4/14/2023 End Date: --    NYSTATIN (MYCOSTATIN) CREAM    Apply 30 g topically 2 (two) times daily.       Start Date: 4/13/2023 End Date:  --    TERBINAFINE HCL (LAMISIL) 250 MG TABLET    Take 250 mg by mouth once daily.       Start Date: 4/13/2023 End Date: --    TIZANIDINE (ZANAFLEX) 2 MG TABLET    Take 2 mg by mouth 2 (two) times daily.       Start Date: 4/24/2023 End Date: --          Follow up in about 3 months (around 11/11/2023) for Anemia followup, Virtual Visit.      Audio/Video Time Documentation:  Spent 24 minutes for telemedicine visit with successful audio/visual connection. [facility] was used for billing.

## 2023-08-17 ENCOUNTER — DOCUMENTATION ONLY (OUTPATIENT)
Dept: FAMILY MEDICINE | Facility: CLINIC | Age: 43
End: 2023-08-17
Payer: COMMERCIAL

## 2023-08-23 ENCOUNTER — HOSPITAL ENCOUNTER (OUTPATIENT)
Dept: RADIOLOGY | Facility: HOSPITAL | Age: 43
Discharge: HOME | End: 2023-08-23
Attending: FAMILY MEDICINE
Payer: COMMERCIAL

## 2023-08-23 DIAGNOSIS — G89.29 CHRONIC PAIN OF LEFT KNEE: ICD-10-CM

## 2023-08-23 DIAGNOSIS — R42 DIZZINESS AND GIDDINESS: ICD-10-CM

## 2023-08-23 DIAGNOSIS — M25.562 CHRONIC PAIN OF LEFT KNEE: ICD-10-CM

## 2023-08-23 DIAGNOSIS — R42 DIZZINESS: ICD-10-CM

## 2023-08-23 PROCEDURE — 73560 X-RAY EXAM OF KNEE 1 OR 2: CPT | Mod: TC,LT

## 2023-08-23 PROCEDURE — 70450 CT HEAD/BRAIN W/O DYE: CPT | Mod: TC

## 2023-08-24 ENCOUNTER — TELEPHONE (OUTPATIENT)
Dept: FAMILY MEDICINE | Facility: CLINIC | Age: 43
End: 2023-08-24
Payer: COMMERCIAL

## 2023-08-28 ENCOUNTER — TELEPHONE (OUTPATIENT)
Dept: FAMILY MEDICINE | Facility: CLINIC | Age: 43
End: 2023-08-28
Payer: COMMERCIAL

## 2023-08-28 NOTE — TELEPHONE ENCOUNTER
----- Message from Thelma Dougherty MD sent at 8/28/2023 12:29 PM CDT -----  XR left knee is normal. No fracture, no dislocation. Please advise if she would like to proceed with Physical Therapy referral.

## 2023-10-25 ENCOUNTER — PATIENT MESSAGE (OUTPATIENT)
Dept: FAMILY MEDICINE | Facility: CLINIC | Age: 43
End: 2023-10-25
Payer: COMMERCIAL

## 2023-11-11 ENCOUNTER — APPOINTMENT (OUTPATIENT)
Dept: LAB | Facility: HOSPITAL | Age: 43
End: 2023-11-11
Attending: FAMILY MEDICINE
Payer: COMMERCIAL

## 2023-11-13 ENCOUNTER — OFFICE VISIT (OUTPATIENT)
Dept: FAMILY MEDICINE | Facility: CLINIC | Age: 43
End: 2023-11-13
Payer: COMMERCIAL

## 2023-11-13 DIAGNOSIS — D50.9 IRON DEFICIENCY ANEMIA, UNSPECIFIED IRON DEFICIENCY ANEMIA TYPE: Primary | ICD-10-CM

## 2023-11-13 DIAGNOSIS — D69.6 LOW PLATELET COUNT: ICD-10-CM

## 2023-11-13 DIAGNOSIS — R06.83 SNORING: ICD-10-CM

## 2023-11-13 DIAGNOSIS — G47.19 EXCESSIVE DAYTIME SLEEPINESS: ICD-10-CM

## 2023-11-13 DIAGNOSIS — G44.209 TENSION HEADACHE: ICD-10-CM

## 2023-11-13 PROCEDURE — 3044F HG A1C LEVEL LT 7.0%: CPT | Mod: CPTII,95,, | Performed by: FAMILY MEDICINE

## 2023-11-13 PROCEDURE — 1160F RVW MEDS BY RX/DR IN RCRD: CPT | Mod: CPTII,95,, | Performed by: FAMILY MEDICINE

## 2023-11-13 PROCEDURE — 99214 OFFICE O/P EST MOD 30 MIN: CPT | Mod: 95,,, | Performed by: FAMILY MEDICINE

## 2023-11-13 PROCEDURE — 1159F PR MEDICATION LIST DOCUMENTED IN MEDICAL RECORD: ICD-10-PCS | Mod: CPTII,95,, | Performed by: FAMILY MEDICINE

## 2023-11-13 PROCEDURE — 1160F PR REVIEW ALL MEDS BY PRESCRIBER/CLIN PHARMACIST DOCUMENTED: ICD-10-PCS | Mod: CPTII,95,, | Performed by: FAMILY MEDICINE

## 2023-11-13 PROCEDURE — 99214 PR OFFICE/OUTPT VISIT, EST, LEVL IV, 30-39 MIN: ICD-10-PCS | Mod: 95,,, | Performed by: FAMILY MEDICINE

## 2023-11-13 PROCEDURE — 1159F MED LIST DOCD IN RCRD: CPT | Mod: CPTII,95,, | Performed by: FAMILY MEDICINE

## 2023-11-13 PROCEDURE — 3044F PR MOST RECENT HEMOGLOBIN A1C LEVEL <7.0%: ICD-10-PCS | Mod: CPTII,95,, | Performed by: FAMILY MEDICINE

## 2023-11-13 RX ORDER — BUTALBITAL, ACETAMINOPHEN AND CAFFEINE 50; 325; 40 MG/1; MG/1; MG/1
1 TABLET ORAL EVERY 6 HOURS PRN
Qty: 30 TABLET | Refills: 2 | Status: SHIPPED | OUTPATIENT
Start: 2023-11-13

## 2023-11-13 RX ORDER — FERROUS GLUCONATE 324(38)MG
324 TABLET ORAL
Qty: 90 TABLET | Refills: 0 | COMMUNITY
Start: 2023-11-13 | End: 2024-02-21 | Stop reason: SDUPTHER

## 2023-11-13 NOTE — PATIENT INSTRUCTIONS
Celio Logan,     If you are due for any health screening(s) below please notify me so we can arrange them to be ordered and scheduled. Most healthy patients at your age complete them, but you are free to accept or refuse.     If you can't do it, I'll definitely understand. If you can, I'd certainly appreciate it!    All of your core healthy metrics are met.

## 2023-11-13 NOTE — PROGRESS NOTES
Patient ID: [id number]     Chief Complaint: Anemia        HPI:     This is a telemedicine note. Patient was treated using telemedicine, real time audio and video, according to [facility] protocols. IThelma M.D. , conducted the visit from the [facility]. The patient participated in the visit at a non-[facility] location selected by the patient, identified below. I am licensed in the state where the patient stated they are located. The patient stated that they understood and accepted the privacy and security risks to their information at their location. This visit is not recorded.    Patient was located at the patient's home.     Doreen Dorman is a 43 y.o. female here today for a telemedicine visit.    - Here for followup of iron deficiency anemia, she is compliant with oral iron as prescribed and iron rich diet, she had labs done with 11/11/2023, here to discuss the results today. She is asymptomatic.   - She reports history of low platelets, has seen Hematology, had negative workup, was told that her platelets stick together and they aren't low, she is asymptomatic.   - She has tension headaches, controlled with Fioricet p.r.n., no side effects, she needs Rx Fioricet refill today.   - She reports heavy snoring and excessive sleepiness x several months. She has never had sleep study in the past.   - She is not interested in flu vaccine.   - Patient is without any other complaints today.         ----------------------------  Hyperthyroidism     Past Surgical History:   Procedure Laterality Date    CERVICAL SPINE SURGERY N/A 08/11/2022       Review of patient's allergies indicates:  No Known Allergies    Outpatient Medications Marked as Taking for the 11/13/23 encounter (Office Visit) with Thelma Dougherty MD   Medication Sig Dispense Refill    clindamycin-benzoyl peroxide gel Apply 45 g topically once daily.      cyclobenzaprine (FLEXERIL) 10 MG tablet Take 10 mg by mouth 2 (two) times  daily as needed.      diclofenac (VOLTAREN) 75 MG EC tablet Take 75 mg by mouth 2 (two) times daily.      methIMAzole (TAPAZOLE) 5 MG Tab Take 7.5 mg by mouth once daily.      nystatin (MYCOSTATIN) cream Apply 30 g topically 2 (two) times daily.      terbinafine HCL (LAMISIL) 250 mg tablet Take 250 mg by mouth once daily.      tiZANidine (ZANAFLEX) 2 MG tablet Take 2 mg by mouth 2 (two) times daily.         Social History     Socioeconomic History    Marital status: Single   Tobacco Use    Smoking status: Never    Smokeless tobacco: Never   Substance and Sexual Activity    Alcohol use: Not Currently    Drug use: Never    Sexual activity: Yes     Partners: Male        Family History   Problem Relation Age of Onset    Breast cancer Maternal Aunt 45        maternal great - aunt        Subjective:       See HPI for details    Constitutional: Denies Change in appetite. Denies Chills. Denies Fever. Denies Night sweats.  Eye: Denies Blurred vision. Denies Discharge. Denies Eye pain.  ENT: Denies Decreased hearing. Denies Sore throat. Denies Swollen glands.  Respiratory: Denies Cough. Denies Shortness of breath. Denies Shortness of breath with exertion. Denies Wheezing.  Cardiovascular: Denies Chest pain at rest. Denies Chest pain with exertion. Denies Irregular heartbeat. Denies Palpitations.  Gastrointestinal: Denies Abdominal pain. Denies Diarrhea. Denies Nausea. Denies Vomiting. Denies Hematemesis or Hematochezia.  Genitourinary: Denies Dysuria. Denies Urinary frequency. Denies Urinary urgency. Denies Blood in urine.  Endocrine: Denies Cold intolerance. Denies Excessive thirst. Denies Heat intolerance. Denies Weight loss. Denies Weight gain.  Musculoskeletal: Denies Painful joints. Denies Weakness.  Integumentary: Denies Rash. Denies Itching. Denies Dry skin.  Neurologic: Denies Dizziness. Denies Fainting. Denies Headache.  Psychiatric: Denies Depression. Denies Anxiety. Denies Suicidal/Homicidal ideations.    All Other  ROS: Negative except as stated in HPI.   Answers submitted by the patient for this visit:  Review of Systems Questionnaire (Submitted on 11/13/2023)  activity change: No  unexpected weight change: Yes  neck pain: Yes  hearing loss: No  rhinorrhea: No  trouble swallowing: No  eye discharge: No  visual disturbance: No  chest tightness: No  wheezing: No  chest pain: No  palpitations: No  blood in stool: No  constipation: Yes  vomiting: No  diarrhea: No  polydipsia: No  polyuria: No  difficulty urinating: No  hematuria: No  menstrual problem: No  dysuria: No  joint swelling: Yes  arthralgias: Yes  headaches: Yes  weakness: No  confusion: No  dysphoric mood: No      Objective:     There were no vitals taken for this visit.    Physical Exam    Physical Exam: LIMITED DUE TO TELEMEDICINE RESTRICTIONS.  General: Alert and oriented, No acute distress.  Head: Normocephalic, Atraumatic.  Eye: Sclera non-icteric.  Neck/Thyroid:  Full range of motion.  Respiratory: Non-labored respirations, Symmetrical chest wall expansion.  Musculoskeletal: Normal range of motion.  Integumentary: Warm, Dry, Intact, No visible suspicious lesions or rashes. No diaphoresis.   Neurologic: No focal deficits  Psychiatric: Normal interaction, Coherent speech, Euthymic mood, Appropriate affect     *Lab results from 11/11/2023 were reviewed and discussed with patient and patient voices understanding.*    Assessment:       ICD-10-CM ICD-9-CM   1. Iron deficiency anemia, unspecified iron deficiency anemia type  D50.9 280.9   2. Low platelet count  D69.6 287.5   3. Tension headache  G44.209 307.81   4. Snoring  R06.83 786.09   5. Excessive daytime sleepiness  G47.19 780.54        Plan:     Problem List Items Addressed This Visit          Hematology    Low platelet count       Oncology    Iron deficiency anemia - Primary    Relevant Medications    ferrous gluconate (FERGON) 324 MG tablet    Other Relevant Orders    CBC Auto Differential    Iron and TIBC      Other Visit Diagnoses       Tension headache        Relevant Medications    butalbital-acetaminophen-caffeine -40 mg (FIORICET, ESGIC) -40 mg per tablet    Snoring        Relevant Orders    Ambulatory referral/consult to Sleep Disorders    Excessive daytime sleepiness        Relevant Orders    Ambulatory referral/consult to Sleep Disorders         1. Iron deficiency anemia, unspecified iron deficiency anemia type  - Rx trial of decreased dose of ferrous gluconate (FERGON) 324 MG tablet; Take 1 tablet (324 mg total) by mouth daily with breakfast.  Dispense: 90 tablet; Refill: 0  - CBC Auto Differential; Future in 02/2023  - Iron and TIBC; Future in 02/2023.     2. Low platelet count  - Asymptomatic, continue to monitor.     3. Tension headache  - Rx butalbital-acetaminophen-caffeine -40 mg (FIORICET, ESGIC) -40 mg per tablet; Take 1 tablet by mouth every 6 (six) hours as needed for Headaches.  Dispense: 30 tablet; Refill: 2 refilled today; keep headache diary. Notify M.D. or ER if symptoms persist or worsen, refractory headache, visual changes, vomiting, temp >100.4, or any acute illness.      4. Snoring  - Ambulatory referral/consult to Sleep Disorders; Future, an at home sleep study has been ordered for this patient, will treat pending results.     5. Excessive daytime sleepiness  - Ambulatory referral/consult to Sleep Disorders; Future, an at home sleep study has been ordered for this patient, will treat pending results.        Doreen was seen today for anemia.    Diagnoses and all orders for this visit:    Iron deficiency anemia, unspecified iron deficiency anemia type  -     CBC Auto Differential; Future  -     Iron and TIBC; Future    Low platelet count    Tension headache  -     butalbital-acetaminophen-caffeine -40 mg (FIORICET, ESGIC) -40 mg per tablet; Take 1 tablet by mouth every 6 (six) hours as needed for Headaches.    Snoring  -     Ambulatory referral/consult to  Sleep Disorders; Future    Excessive daytime sleepiness  -     Ambulatory referral/consult to Sleep Disorders; Future          Medication List with Changes/Refills   New Medications    BUTALBITAL-ACETAMINOPHEN-CAFFEINE -40 MG (FIORICET, ESGIC) -40 MG PER TABLET    Take 1 tablet by mouth every 6 (six) hours as needed for Headaches.       Start Date: 11/13/2023End Date: --   Current Medications    CLINDAMYCIN-BENZOYL PEROXIDE GEL    Apply 45 g topically once daily.       Start Date: 4/13/2023 End Date: --    CYCLOBENZAPRINE (FLEXERIL) 10 MG TABLET    Take 10 mg by mouth 2 (two) times daily as needed.       Start Date: 10/20/2022End Date: --    DICLOFENAC (VOLTAREN) 75 MG EC TABLET    Take 75 mg by mouth 2 (two) times daily.       Start Date: 1/18/2023 End Date: --    METHIMAZOLE (TAPAZOLE) 5 MG TAB    Take 7.5 mg by mouth once daily.       Start Date: 4/14/2023 End Date: --    NYSTATIN (MYCOSTATIN) CREAM    Apply 30 g topically 2 (two) times daily.       Start Date: 4/13/2023 End Date: --    TERBINAFINE HCL (LAMISIL) 250 MG TABLET    Take 250 mg by mouth once daily.       Start Date: 4/13/2023 End Date: --    TIZANIDINE (ZANAFLEX) 2 MG TABLET    Take 2 mg by mouth 2 (two) times daily.       Start Date: 4/24/2023 End Date: --   Changed and/or Refilled Medications    Modified Medication Previous Medication    FERROUS GLUCONATE (FERGON) 324 MG TABLET ferrous gluconate (FERGON) 324 MG tablet       Take 1 tablet (324 mg total) by mouth daily with breakfast.    Take 1 tablet (324 mg total) by mouth 2 (two) times daily with meals.       Start Date: 11/13/2023End Date: --    Start Date: 8/11/2023 End Date: 11/13/2023          Follow up in about 3 months (around 2/13/2024) for Anemia followup, discuss sleep study results, Virtual Visit.      Audio/Video Time Documentation:  Spent 16 minutes for telemedicine visit with successful audio/visual connection. [facility] was used for billing.

## 2023-12-08 ENCOUNTER — DOCUMENTATION ONLY (OUTPATIENT)
Dept: FAMILY MEDICINE | Facility: CLINIC | Age: 43
End: 2023-12-08
Payer: COMMERCIAL

## 2024-01-17 ENCOUNTER — HOSPITAL ENCOUNTER (OUTPATIENT)
Dept: RADIOLOGY | Facility: HOSPITAL | Age: 44
Discharge: HOME | End: 2024-01-17
Attending: FAMILY MEDICINE
Payer: COMMERCIAL

## 2024-01-17 VITALS — BODY MASS INDEX: 28.34 KG/M2 | WEIGHT: 166 LBS | HEIGHT: 64 IN

## 2024-01-17 DIAGNOSIS — R92.8 ABNORMAL MAMMOGRAM: ICD-10-CM

## 2024-01-17 PROCEDURE — 76641 ULTRASOUND BREAST COMPLETE: CPT | Mod: 26,LT,, | Performed by: RADIOLOGY

## 2024-01-17 PROCEDURE — 77061 BREAST TOMOSYNTHESIS UNI: CPT | Mod: TC,LT

## 2024-01-17 PROCEDURE — 76641 ULTRASOUND BREAST COMPLETE: CPT | Mod: TC,LT

## 2024-01-17 PROCEDURE — 77065 DX MAMMO INCL CAD UNI: CPT | Mod: TC,LT

## 2024-01-17 PROCEDURE — 77061 BREAST TOMOSYNTHESIS UNI: CPT | Mod: 26,LT,, | Performed by: RADIOLOGY

## 2024-01-17 PROCEDURE — 77065 DX MAMMO INCL CAD UNI: CPT | Mod: 26,LT,, | Performed by: RADIOLOGY

## 2024-01-18 ENCOUNTER — PATIENT MESSAGE (OUTPATIENT)
Dept: FAMILY MEDICINE | Facility: CLINIC | Age: 44
End: 2024-01-18
Payer: COMMERCIAL

## 2024-01-19 ENCOUNTER — PATIENT MESSAGE (OUTPATIENT)
Dept: FAMILY MEDICINE | Facility: CLINIC | Age: 44
End: 2024-01-19
Payer: COMMERCIAL

## 2024-01-19 NOTE — PROGRESS NOTES
Abnormal mammogram.     Bilateral diagnostic mammography with tomosynthesis, followed by targeted left breast ultrasound, will be due in 4 months as part of the two year follow-up process for probably benign findings (May 2024).        Thanks,    Dr. Moore

## 2024-01-19 NOTE — PROGRESS NOTES
Mammogram demonstrates multiple benign cysts    We will need Bilateral diagnostic mammography with tomosynthesis, followed by targeted left breast ultrasound, will be due in 4 months as part of the two year follow-up process for probably benign findings (May 2024).        Thanks,    Dr. Moore    Thanks,    Dr. Moore

## 2024-01-29 ENCOUNTER — PATIENT MESSAGE (OUTPATIENT)
Dept: FAMILY MEDICINE | Facility: CLINIC | Age: 44
End: 2024-01-29
Payer: COMMERCIAL

## 2024-02-19 ENCOUNTER — LAB VISIT (OUTPATIENT)
Dept: LAB | Facility: HOSPITAL | Age: 44
End: 2024-02-19
Attending: FAMILY MEDICINE
Payer: COMMERCIAL

## 2024-02-19 DIAGNOSIS — D50.9 IRON DEFICIENCY ANEMIA, UNSPECIFIED IRON DEFICIENCY ANEMIA TYPE: ICD-10-CM

## 2024-02-19 LAB
BASOPHILS # BLD AUTO: 0.05 X10(3)/MCL
BASOPHILS NFR BLD AUTO: 0.8 %
EOSINOPHIL # BLD AUTO: 0.09 X10(3)/MCL (ref 0–0.9)
EOSINOPHIL NFR BLD AUTO: 1.4 %
ERYTHROCYTE [DISTWIDTH] IN BLOOD BY AUTOMATED COUNT: 15.5 % (ref 11.5–17)
HCT VFR BLD AUTO: 41.6 % (ref 37–47)
HGB BLD-MCNC: 12.4 G/DL (ref 12–16)
IMM GRANULOCYTES # BLD AUTO: 0.07 X10(3)/MCL (ref 0–0.04)
IMM GRANULOCYTES NFR BLD AUTO: 1.1 %
IRON SATN MFR SERPL: 14 % (ref 20–50)
IRON SERPL-MCNC: 48 UG/DL (ref 50–170)
LYMPHOCYTES # BLD AUTO: 1.77 X10(3)/MCL (ref 0.6–4.6)
LYMPHOCYTES NFR BLD AUTO: 27.9 %
MCH RBC QN AUTO: 26.9 PG (ref 27–31)
MCHC RBC AUTO-ENTMCNC: 29.8 G/DL (ref 33–36)
MCV RBC AUTO: 90.2 FL (ref 80–94)
MONOCYTES # BLD AUTO: 0.34 X10(3)/MCL (ref 0.1–1.3)
MONOCYTES NFR BLD AUTO: 5.4 %
NEUTROPHILS # BLD AUTO: 4.03 X10(3)/MCL (ref 2.1–9.2)
NEUTROPHILS NFR BLD AUTO: 63.4 %
NRBC BLD AUTO-RTO: 0 %
PLATELET # BLD AUTO: 110 X10(3)/MCL (ref 130–400)
PLATELETS.RETICULATED NFR BLD AUTO: 25.9 % (ref 0.9–11.2)
PMV BLD AUTO: ABNORMAL FL
RBC # BLD AUTO: 4.61 X10(6)/MCL (ref 4.2–5.4)
TIBC SERPL-MCNC: 285 UG/DL (ref 70–310)
TIBC SERPL-MCNC: 333 UG/DL (ref 250–450)
TRANSFERRIN SERPL-MCNC: 330 MG/DL (ref 180–382)
WBC # SPEC AUTO: 6.35 X10(3)/MCL (ref 4.5–11.5)

## 2024-02-19 PROCEDURE — 83540 ASSAY OF IRON: CPT

## 2024-02-19 PROCEDURE — 36415 COLL VENOUS BLD VENIPUNCTURE: CPT

## 2024-02-19 PROCEDURE — 85025 COMPLETE CBC W/AUTO DIFF WBC: CPT

## 2024-02-21 ENCOUNTER — OFFICE VISIT (OUTPATIENT)
Dept: FAMILY MEDICINE | Facility: CLINIC | Age: 44
End: 2024-02-21
Payer: COMMERCIAL

## 2024-02-21 VITALS — HEIGHT: 64 IN | WEIGHT: 165 LBS | BODY MASS INDEX: 28.17 KG/M2

## 2024-02-21 DIAGNOSIS — M25.562 CHRONIC PAIN OF BOTH KNEES: ICD-10-CM

## 2024-02-21 DIAGNOSIS — D50.9 IRON DEFICIENCY ANEMIA, UNSPECIFIED IRON DEFICIENCY ANEMIA TYPE: Primary | ICD-10-CM

## 2024-02-21 DIAGNOSIS — E66.3 OVERWEIGHT: ICD-10-CM

## 2024-02-21 DIAGNOSIS — G89.29 CHRONIC PAIN OF BOTH KNEES: ICD-10-CM

## 2024-02-21 DIAGNOSIS — D69.6 LOW PLATELET COUNT: ICD-10-CM

## 2024-02-21 DIAGNOSIS — M25.561 CHRONIC PAIN OF BOTH KNEES: ICD-10-CM

## 2024-02-21 PROCEDURE — 1159F MED LIST DOCD IN RCRD: CPT | Mod: CPTII,95,, | Performed by: FAMILY MEDICINE

## 2024-02-21 PROCEDURE — 1160F RVW MEDS BY RX/DR IN RCRD: CPT | Mod: CPTII,95,, | Performed by: FAMILY MEDICINE

## 2024-02-21 PROCEDURE — 99214 OFFICE O/P EST MOD 30 MIN: CPT | Mod: 95,,, | Performed by: FAMILY MEDICINE

## 2024-02-21 PROCEDURE — 3008F BODY MASS INDEX DOCD: CPT | Mod: CPTII,95,, | Performed by: FAMILY MEDICINE

## 2024-02-21 RX ORDER — TIRZEPATIDE 2.5 MG/.5ML
2.5 INJECTION, SOLUTION SUBCUTANEOUS
Qty: 4 PEN | Refills: 1 | Status: SHIPPED | OUTPATIENT
Start: 2024-02-21 | End: 2024-04-02

## 2024-02-21 RX ORDER — DICLOFENAC SODIUM 10 MG/G
2 GEL TOPICAL 4 TIMES DAILY PRN
Qty: 100 G | Refills: 0 | Status: SHIPPED | OUTPATIENT
Start: 2024-02-21

## 2024-02-21 RX ORDER — FERROUS GLUCONATE 324(38)MG
324 TABLET ORAL 2 TIMES DAILY WITH MEALS
Qty: 60 TABLET | Refills: 2 | Status: SHIPPED | OUTPATIENT
Start: 2024-02-21 | End: 2024-04-02 | Stop reason: SDUPTHER

## 2024-02-21 RX ORDER — PROGESTERONE 200 MG/1
200 CAPSULE ORAL
COMMUNITY
Start: 2024-01-29

## 2024-02-21 NOTE — PROGRESS NOTES
Patient ID: [id number]     Chief Complaint: Anemia (3 MTH f/u) and Sleep study (Discuss results)        HPI:     This is a telemedicine note. Patient was treated using telemedicine, real time audio and video, according to [facility] protocols. Thelma DAMON M.D. , conducted the visit from the [facility]. The patient participated in the visit at a non-[facility] location selected by the patient, identified below. I am licensed in the state where the patient stated they are located. The patient stated that they understood and accepted the privacy and security risks to their information at their location. This visit is not recorded.    Patient was located at the patient's car.     Doreen Dorman is a 43 y.o. female here today for a telemedicine visit.    - Here for followup of iron deficiency anemia, she is compliant with oral iron daily as prescribed and iron rich diet, she had labs done with 02/19/2024, here to discuss the results today. She is asymptomatic. She is UTD on pap smear and workup with her GYN (Dr. Ross) and she has heavy cycles, now on progesterone she has NL Colonoscopy with GI (Dr. Cantu).   - She reports history of low platelets, has seen Hematology, had negative workup, was told that her platelets stick together and they aren't low, she is asymptomatic.   - She has chronic bilateral knee pain x several months, she has tried oral Voltaren without resolution of symptoms, she would like to try topical Voltaren and proceed with Ortho referral.   - She is overweight and would like to try Rx to help with weight loss, has tried diet and exercise without success. She would like to try Zepbound. She denies family history of MEN II or medullary thyroid cancer or personal history of pancreatitis. She is not trying to get pregnant. She is not interested in seeing a dietician.   - Patient is without any other complaints today.         ----------------------------  Hyperthyroidism     Past  Surgical History:   Procedure Laterality Date    CERVICAL SPINE SURGERY N/A 08/11/2022       Review of patient's allergies indicates:  No Known Allergies    Outpatient Medications Marked as Taking for the 2/21/24 encounter (Office Visit) with Thelma Dougherty MD   Medication Sig Dispense Refill    butalbital-acetaminophen-caffeine -40 mg (FIORICET, ESGIC) -40 mg per tablet Take 1 tablet by mouth every 6 (six) hours as needed for Headaches. 30 tablet 2    clindamycin-benzoyl peroxide gel Apply 45 g topically once daily.      methIMAzole (TAPAZOLE) 5 MG Tab Take 7.5 mg by mouth once daily.      nystatin (MYCOSTATIN) cream Apply 30 g topically 2 (two) times daily.      progesterone (PROMETRIUM) 200 MG capsule Take 200 mg by mouth every 14 (fourteen) days.      terbinafine HCL (LAMISIL) 250 mg tablet Take 250 mg by mouth once daily.      tiZANidine (ZANAFLEX) 2 MG tablet Take 2 mg by mouth 2 (two) times daily.      [DISCONTINUED] diclofenac (VOLTAREN) 75 MG EC tablet Take 75 mg by mouth 2 (two) times daily.      [DISCONTINUED] ferrous gluconate (FERGON) 324 MG tablet Take 1 tablet (324 mg total) by mouth daily with breakfast. 90 tablet 0       Social History     Socioeconomic History    Marital status: Single   Tobacco Use    Smoking status: Never     Passive exposure: Never    Smokeless tobacco: Never   Substance and Sexual Activity    Alcohol use: Not Currently    Drug use: Never    Sexual activity: Yes     Partners: Male     Social Determinants of Health     Financial Resource Strain: Medium Risk (2/21/2024)    Overall Financial Resource Strain (CARDIA)     Difficulty of Paying Living Expenses: Somewhat hard   Food Insecurity: Food Insecurity Present (2/21/2024)    Hunger Vital Sign     Worried About Running Out of Food in the Last Year: Sometimes true     Ran Out of Food in the Last Year: Sometimes true   Transportation Needs: No Transportation Needs (2/21/2024)    PRAPARE - Transportation     Lack  of Transportation (Medical): No     Lack of Transportation (Non-Medical): No   Physical Activity: Sufficiently Active (2/21/2024)    Exercise Vital Sign     Days of Exercise per Week: 4 days     Minutes of Exercise per Session: 60 min   Stress: Stress Concern Present (2/21/2024)    [employer] - Occupational Stress Questionnaire     Feeling of Stress : Very much   Social Connections: Unknown (2/21/2024)    Social Connection and Isolation Panel [NHANES]     Frequency of Communication with Friends and Family: More than three times a week     Frequency of Social Gatherings with Friends and Family: Once a week     Active Member of Clubs or Organizations: Yes     Attends Club or Organization Meetings: 1 to 4 times per year     Marital Status: Never    Housing Stability: Low Risk  (2/21/2024)    Housing Stability Vital Sign     Unable to Pay for Housing in the Last Year: No     Number of Places Lived in the Last Year: 1     Unstable Housing in the Last Year: No        Family History   Problem Relation Age of Onset    Breast cancer Maternal Aunt 45        maternal great - aunt        Subjective:       See HPI for details    Constitutional: Denies Change in appetite. Denies Chills. Denies Fever. Denies Night sweats.  Eye: Denies Blurred vision. Denies Discharge. Denies Eye pain.  ENT: Denies Decreased hearing. Denies Sore throat. Denies Swollen glands.  Respiratory: Denies Cough. Denies Shortness of breath. Denies Shortness of breath with exertion. Denies Wheezing.  Cardiovascular: Denies Chest pain at rest. Denies Chest pain with exertion. Denies Irregular heartbeat. Denies Palpitations.  Gastrointestinal: Denies Abdominal pain. Denies Diarrhea. Denies Nausea. Denies Vomiting. Denies Hematemesis or Hematochezia.  Genitourinary: Denies Dysuria. Denies Urinary frequency. Denies Urinary urgency. Denies Blood in urine.  Endocrine: Denies Cold intolerance. Denies Excessive thirst. Denies Heat  "intolerance. Denies Weight loss. Denies Weight gain.  Musculoskeletal: Denies Painful joints. Denies Weakness.  Integumentary: Denies Rash. Denies Itching. Denies Dry skin.  Neurologic: Denies Dizziness. Denies Fainting. Denies Headache.  Psychiatric: Denies Depression. Denies Anxiety. Denies Suicidal/Homicidal ideations.    All Other ROS: Negative except as stated in HPI.   Answers submitted by the patient for this visit:  Review of Systems Questionnaire (Submitted on 2/21/2024)  activity change: Yes  unexpected weight change: Yes  neck pain: Yes  hearing loss: No  rhinorrhea: No  trouble swallowing: No  eye discharge: No  visual disturbance: No  chest tightness: No  wheezing: No  chest pain: No  palpitations: No  blood in stool: No  constipation: Yes  vomiting: No  diarrhea: No  polydipsia: No  polyuria: No  difficulty urinating: No  hematuria: No  menstrual problem: Yes  dysuria: No  joint swelling: No  arthralgias: No  headaches: Yes  weakness: No  confusion: No  dysphoric mood: No    Objective:     Ht 5' 4" (1.626 m)   Wt 74.8 kg (165 lb)   LMP 02/14/2024 (Exact Date)   BMI 28.32 kg/m²     Physical Exam    Physical Exam: LIMITED DUE TO TELEMEDICINE RESTRICTIONS.  General: Alert and oriented, No acute distress.Overweight.   Head: Normocephalic, Atraumatic.  Eye: Sclera non-icteric.  Neck/Thyroid:  Full range of motion.  Respiratory: Non-labored respirations, Symmetrical chest wall expansion.  Musculoskeletal: Normal range of motion.  Integumentary: Warm, Dry, Intact, No visible suspicious lesions or rashes. No diaphoresis.   Neurologic: No focal deficits  Psychiatric: Normal interaction, Coherent speech, Euthymic mood, Appropriate affect     *Lab results from 02/19/2024 were reviewed and discussed with patient and patient voices understanding.*    Assessment:       ICD-10-CM ICD-9-CM   1. Iron deficiency anemia, unspecified iron deficiency anemia type  D50.9 280.9   2. Low platelet count  D69.6 287.5   3. " Chronic pain of both knees  M25.561 719.46    M25.562 338.29    G89.29    4. Overweight  E66.3 278.02        Plan:     Problem List Items Addressed This Visit          Hematology    Low platelet count    Relevant Orders    CBC Auto Differential    Iron and TIBC       Oncology    Iron deficiency anemia - Primary    Relevant Medications    ferrous gluconate (FERGON) 324 MG tablet    Other Relevant Orders    CBC Auto Differential    Iron and TIBC     Other Visit Diagnoses       Chronic pain of both knees        Relevant Medications    diclofenac sodium (VOLTAREN) 1 % Gel    Other Relevant Orders    Ambulatory referral/consult to Orthopedics    Overweight        Relevant Medications    tirzepatide, weight loss, (ZEPBOUND) 2.5 mg/0.5 mL PnIj         1. Iron deficiency anemia, unspecified iron deficiency anemia type  - Rx trial of ferrous gluconate (FERGON) 324 MG tablet; Take 1 tablet (324 mg total) by mouth 2 (two) times daily with meals.  Dispense: 60 tablet; Refill: 2  - CBC Auto Differential; Future in 4 weeks  - Iron and TIBC; Future in 4 weeks  - Most likely due to GYN origin, will treat pending results, if no improvement with oral iron, will consult Hematology for iron infusions. Notify M.D. or ER if symptoms persist or worsen, temp >100.4, or any acute illness.      2. Low platelet count  - CBC Auto Differential; Future  - Iron and TIBC; Future  - Asymptomatic, Same as #1.     3. Chronic pain of both knees  - Rx trial of diclofenac sodium (VOLTAREN) 1 % Gel; Apply 2 g topically 4 (four) times daily as needed (pain/inflammation).  Dispense: 100 g; Refill: 0  - Ambulatory referral/consult to Orthopedics; Future    4. Overweight  - Rx trial of tirzepatide, weight loss, (ZEPBOUND) 2.5 mg/0.5 mL PnIj; Inject 2.5 mg into the skin every 7 days.  Dispense: 4 Pen; Refill: 1  - Diet, exercise, and 10% weight loss encouraged. Rx trial of Zepbound with close monitoring to help with insulin resistance and overweight. She  agrees to avoid pregnancy with Zepbound and patient to download Zepbound coupon card from the internet. Will titrate Rx Zepbound as needed/tolerated until max dose achieved. Notify M.D. or ER if symptoms persist or worsen, adverse Rx side effects, temp greater than 100.4, or any acute illness.    Body mass index is 28.32 kg/m².  Goal BMI <25.  Exercise 5 times a week for 30 minutes per day.  Avoid soda, simple sugars, excessive rice, potatoes or bread. Limit fast foods and fried foods.  Choose complex carbs in moderation (example: green vegetables, beans, oatmeal). Eat plenty of fresh fruits and vegetables with lean meats daily.  Do not skip meals. Eat a balanced portion size.  Avoid fad diets. Consider permanent healthy life style changes.        Doreen was seen today for anemia and sleep study.    Diagnoses and all orders for this visit:    Iron deficiency anemia, unspecified iron deficiency anemia type  -     ferrous gluconate (FERGON) 324 MG tablet; Take 1 tablet (324 mg total) by mouth 2 (two) times daily with meals.  -     CBC Auto Differential; Future  -     Iron and TIBC; Future    Low platelet count  -     CBC Auto Differential; Future  -     Iron and TIBC; Future    Chronic pain of both knees  -     diclofenac sodium (VOLTAREN) 1 % Gel; Apply 2 g topically 4 (four) times daily as needed (pain/inflammation).  -     Ambulatory referral/consult to Orthopedics; Future    Overweight  -     tirzepatide, weight loss, (ZEPBOUND) 2.5 mg/0.5 mL PnIj; Inject 2.5 mg into the skin every 7 days.          Medication List with Changes/Refills   New Medications    DICLOFENAC SODIUM (VOLTAREN) 1 % GEL    Apply 2 g topically 4 (four) times daily as needed (pain/inflammation).       Start Date: 2/21/2024 End Date: --    TIRZEPATIDE, WEIGHT LOSS, (ZEPBOUND) 2.5 MG/0.5 ML PNIJ    Inject 2.5 mg into the skin every 7 days.       Start Date: 2/21/2024 End Date: 4/21/2024   Current Medications    BUTALBITAL-ACETAMINOPHEN-CAFFEINE  -40 MG (FIORICET, ESGIC) -40 MG PER TABLET    Take 1 tablet by mouth every 6 (six) hours as needed for Headaches.       Start Date: 11/13/2023End Date: --    CLINDAMYCIN-BENZOYL PEROXIDE GEL    Apply 45 g topically once daily.       Start Date: 4/13/2023 End Date: --    CYCLOBENZAPRINE (FLEXERIL) 10 MG TABLET    Take 10 mg by mouth 2 (two) times daily as needed.       Start Date: 10/20/2022End Date: --    METHIMAZOLE (TAPAZOLE) 5 MG TAB    Take 7.5 mg by mouth once daily.       Start Date: 4/14/2023 End Date: --    NYSTATIN (MYCOSTATIN) CREAM    Apply 30 g topically 2 (two) times daily.       Start Date: 4/13/2023 End Date: --    PROGESTERONE (PROMETRIUM) 200 MG CAPSULE    Take 200 mg by mouth every 14 (fourteen) days.       Start Date: 1/29/2024 End Date: --    TERBINAFINE HCL (LAMISIL) 250 MG TABLET    Take 250 mg by mouth once daily.       Start Date: 4/13/2023 End Date: --    TIZANIDINE (ZANAFLEX) 2 MG TABLET    Take 2 mg by mouth 2 (two) times daily.       Start Date: 4/24/2023 End Date: --   Changed and/or Refilled Medications    Modified Medication Previous Medication    FERROUS GLUCONATE (FERGON) 324 MG TABLET ferrous gluconate (FERGON) 324 MG tablet       Take 1 tablet (324 mg total) by mouth 2 (two) times daily with meals.    Take 1 tablet (324 mg total) by mouth daily with breakfast.       Start Date: 2/21/2024 End Date: 5/21/2024    Start Date: 11/13/2023End Date: 2/21/2024   Discontinued Medications    DICLOFENAC (VOLTAREN) 75 MG EC TABLET    Take 75 mg by mouth 2 (two) times daily.       Start Date: 1/18/2023 End Date: 2/21/2024          Follow up in about 4 weeks (around 3/20/2024) for Anemia Followup, Weight Followup, Virtual Visit.      Audio/Video Time Documentation:  Spent 23 minutes for telemedicine visit with successful audio/visual connection. [facility] was used for billing.

## 2024-03-05 ENCOUNTER — TELEPHONE (OUTPATIENT)
Dept: ORTHOPEDICS | Facility: CLINIC | Age: 44
End: 2024-03-05
Payer: COMMERCIAL

## 2024-03-12 ENCOUNTER — OFFICE VISIT (OUTPATIENT)
Dept: ORTHOPEDICS | Facility: CLINIC | Age: 44
End: 2024-03-12
Payer: COMMERCIAL

## 2024-03-12 ENCOUNTER — HOSPITAL ENCOUNTER (OUTPATIENT)
Dept: RADIOLOGY | Facility: CLINIC | Age: 44
Discharge: HOME | End: 2024-03-12
Attending: ORTHOPAEDIC SURGERY
Payer: COMMERCIAL

## 2024-03-12 VITALS
HEIGHT: 64 IN | BODY MASS INDEX: 29.19 KG/M2 | WEIGHT: 171 LBS | SYSTOLIC BLOOD PRESSURE: 130 MMHG | DIASTOLIC BLOOD PRESSURE: 86 MMHG | HEART RATE: 73 BPM

## 2024-03-12 DIAGNOSIS — M25.562 CHRONIC PAIN OF BOTH KNEES: ICD-10-CM

## 2024-03-12 DIAGNOSIS — M23.203 DEGENERATIVE TEAR OF MEDIAL MENISCUS OF BOTH KNEES: ICD-10-CM

## 2024-03-12 DIAGNOSIS — M25.561 CHRONIC PAIN OF BOTH KNEES: ICD-10-CM

## 2024-03-12 DIAGNOSIS — M17.0 PRIMARY OSTEOARTHRITIS OF BOTH KNEES: Primary | ICD-10-CM

## 2024-03-12 DIAGNOSIS — M23.204 DEGENERATIVE TEAR OF MEDIAL MENISCUS OF BOTH KNEES: ICD-10-CM

## 2024-03-12 DIAGNOSIS — G89.29 CHRONIC PAIN OF BOTH KNEES: ICD-10-CM

## 2024-03-12 PROCEDURE — 73564 X-RAY EXAM KNEE 4 OR MORE: CPT | Mod: ,,, | Performed by: ORTHOPAEDIC SURGERY

## 2024-03-12 PROCEDURE — 99204 OFFICE O/P NEW MOD 45 MIN: CPT | Mod: 25,,, | Performed by: ORTHOPAEDIC SURGERY

## 2024-03-12 PROCEDURE — 20610 DRAIN/INJ JOINT/BURSA W/O US: CPT | Mod: 50,,, | Performed by: ORTHOPAEDIC SURGERY

## 2024-03-12 PROCEDURE — 1159F MED LIST DOCD IN RCRD: CPT | Mod: CPTII,,, | Performed by: ORTHOPAEDIC SURGERY

## 2024-03-12 PROCEDURE — 3075F SYST BP GE 130 - 139MM HG: CPT | Mod: CPTII,,, | Performed by: ORTHOPAEDIC SURGERY

## 2024-03-12 PROCEDURE — 1160F RVW MEDS BY RX/DR IN RCRD: CPT | Mod: CPTII,,, | Performed by: ORTHOPAEDIC SURGERY

## 2024-03-12 PROCEDURE — 3079F DIAST BP 80-89 MM HG: CPT | Mod: CPTII,,, | Performed by: ORTHOPAEDIC SURGERY

## 2024-03-12 PROCEDURE — 3008F BODY MASS INDEX DOCD: CPT | Mod: CPTII,,, | Performed by: ORTHOPAEDIC SURGERY

## 2024-03-12 RX ORDER — LIDOCAINE HYDROCHLORIDE 20 MG/ML
3 INJECTION, SOLUTION INFILTRATION; PERINEURAL
Status: DISCONTINUED | OUTPATIENT
Start: 2024-03-12 | End: 2024-03-12 | Stop reason: HOSPADM

## 2024-03-12 RX ORDER — METHYLPREDNISOLONE ACETATE 80 MG/ML
40 INJECTION, SUSPENSION INTRA-ARTICULAR; INTRALESIONAL; INTRAMUSCULAR; SOFT TISSUE
Status: DISCONTINUED | OUTPATIENT
Start: 2024-03-12 | End: 2024-03-12 | Stop reason: HOSPADM

## 2024-03-12 RX ORDER — MONTELUKAST SODIUM 4 MG/1
60 TABLET, CHEWABLE ORAL
COMMUNITY
Start: 2024-03-11

## 2024-03-12 RX ADMIN — LIDOCAINE HYDROCHLORIDE 3 MG: 20 INJECTION, SOLUTION INFILTRATION; PERINEURAL at 02:03

## 2024-03-12 RX ADMIN — METHYLPREDNISOLONE ACETATE 40 MG: 80 INJECTION, SUSPENSION INTRA-ARTICULAR; INTRALESIONAL; INTRAMUSCULAR; SOFT TISSUE at 02:03

## 2024-03-12 NOTE — PROCEDURES
Large Joint Aspiration/Injection: bilateral knee    Date/Time: 3/12/2024 2:30 PM    Performed by: Pascual Menchaca MD  Authorized by: Pascual Menchaca MD    Consent Done?:  Yes (Verbal)  Indications:  Pain  Timeout: prior to procedure the correct patient, procedure, and site was verified    Prep: patient was prepped and draped in usual sterile fashion      Details:  Needle Size:  25 G  Approach:  Anterolateral  Location:  Knee  Laterality:  Bilateral  Site:  Bilateral knee  Medications (Right):  3 mg LIDOcaine HCL 20 mg/ml (2%) 20 mg/mL (2 %); 40 mg methylPREDNISolone acetate 80 mg/mL  Medications (Left):  3 mg LIDOcaine HCL 20 mg/ml (2%) 20 mg/mL (2 %); 40 mg methylPREDNISolone acetate 80 mg/mL  Patient tolerance:  Patient tolerated the procedure well with no immediate complications

## 2024-03-12 NOTE — PROGRESS NOTES
Orthopaedic Clinic  Orthopedic Clinic Note      Chief Complaint:   Chief Complaint   Patient presents with    Knee Pain     Bilateral knee pain. No prior sx. No prior injections. Xr done today. She states pain has been ongoing since last year and she does not remember any injury or trauma. Pain is mainly localized to the back of her knee and is made worse in the morning and with stairs and bending movements. Taking diclofenac medication and using cream for pain.     Referring Physician: Thelma Dougherty, *      History of Present Illness:    This is a 43 y.o. year old female presenting with bilateral knee pain.  That present today with bilateral knee pain. Patient has had this knee pain for several months. This knee pain is moderate to severe. Patient states pain is worse with squats and lunges. Patient states pain is global. Patient has not been treated previously with medication, bracing, injections, therapy.      Past Medical History:   Diagnosis Date    Hyperthyroidism        Past Surgical History:   Procedure Laterality Date    CERVICAL SPINE SURGERY N/A 08/11/2022    NASAL SEPTOPLASTY         Current Outpatient Medications   Medication Sig    butalbital-acetaminophen-caffeine -40 mg (FIORICET, ESGIC) -40 mg per tablet Take 1 tablet by mouth every 6 (six) hours as needed for Headaches.    clindamycin-benzoyl peroxide gel Apply 45 g topically once daily.    colestipoL (COLESTID) 1 gram Tab 60 tablets.    cyclobenzaprine (FLEXERIL) 10 MG tablet Take 10 mg by mouth 2 (two) times daily as needed.    diclofenac sodium (VOLTAREN) 1 % Gel Apply 2 g topically 4 (four) times daily as needed (pain/inflammation).    ferrous gluconate (FERGON) 324 MG tablet Take 1 tablet (324 mg total) by mouth 2 (two) times daily with meals.    methIMAzole (TAPAZOLE) 5 MG Tab Take 7.5 mg by mouth once daily.    nystatin (MYCOSTATIN) cream Apply 30 g topically 2 (two) times daily.    progesterone (PROMETRIUM) 200 MG  capsule Take 200 mg by mouth every 14 (fourteen) days.    terbinafine HCL (LAMISIL) 250 mg tablet Take 250 mg by mouth once daily.    tirzepatide, weight loss, (ZEPBOUND) 2.5 mg/0.5 mL PnIj Inject 2.5 mg into the skin every 7 days.    tiZANidine (ZANAFLEX) 2 MG tablet Take 2 mg by mouth 2 (two) times daily.     No current facility-administered medications for this visit.       Review of patient's allergies indicates:  No Known Allergies    Family History   Problem Relation Age of Onset    No Known Problems Mother     No Known Problems Father     Breast cancer Maternal Aunt 45        maternal great - aunt       Social History     Socioeconomic History    Marital status: Single   Tobacco Use    Smoking status: Never     Passive exposure: Never    Smokeless tobacco: Never   Substance and Sexual Activity    Alcohol use: Not Currently    Drug use: Never    Sexual activity: Yes     Partners: Male     Social Determinants of Health     Financial Resource Strain: Medium Risk (2/21/2024)    Overall Financial Resource Strain (CARDIA)     Difficulty of Paying Living Expenses: Somewhat hard   Food Insecurity: Food Insecurity Present (2/21/2024)    Hunger Vital Sign     Worried About Running Out of Food in the Last Year: Sometimes true     Ran Out of Food in the Last Year: Sometimes true   Transportation Needs: No Transportation Needs (2/21/2024)    PRAPARE - Transportation     Lack of Transportation (Medical): No     Lack of Transportation (Non-Medical): No   Physical Activity: Sufficiently Active (2/21/2024)    Exercise Vital Sign     Days of Exercise per Week: 4 days     Minutes of Exercise per Session: 60 min   Stress: Stress Concern Present (2/21/2024)    [employer] - Occupational Stress Questionnaire     Feeling of Stress : Very much   Social Connections: Unknown (2/21/2024)    Social Connection and Isolation Panel [NHANES]     Frequency of Communication with Friends and Family: More than three  "times a week     Frequency of Social Gatherings with Friends and Family: Once a week     Active Member of Clubs or Organizations: Yes     Attends Club or Organization Meetings: 1 to 4 times per year     Marital Status: Never    Housing Stability: Low Risk  (2/21/2024)    Housing Stability Vital Sign     Unable to Pay for Housing in the Last Year: No     Number of Places Lived in the Last Year: 1     Unstable Housing in the Last Year: No           Review of Systems:  All review of systems negative except for those stated in the HPI.    Examination:    Vital Signs:    Vitals:    03/12/24 1445   BP: 130/86   Pulse: 73   Weight: 77.6 kg (171 lb)   Height: 5' 4" (1.626 m)       Body mass index is 29.35 kg/m².    Physical Examination:  General: Well-developed, well-nourished.  Neuro: Alert and oriented x 3.  Psych: Normal mood and affect.  Card: Regular rate and rhythm.  Resp: Unlabored and quiet.  Bilateral Knee Exam:  Varus deformity. Range of motion from 0-115 degrees. Negative patella grind and equal subluxation of knee cap medial and lateral < 1cm. Negative patella tendon tenderness. Negative Lachman and anterior drawer test. Negative posterior drawer test. Negative varus and valgus stress test. Positive medial joint line tenderness. Negative lateral joint line tenderness. 4/5 strength and normal skin appearance. Sensibility normal.      Imaging: X-rays ordered and images interpreted today personally by me of four views of both knees that demonstrate some medial compartment space narrowing.      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  We will treat her today with bilateral knee corticosteroid injections.  If she does not respond favorably to this then we will consider gel injections.  I do not think she is a candidate for knee replacement yet.  I would consider an MRI if she does not respond well to these injections to see if she has any degenerative meniscal tears.  She may be a decent candidate for a knee arthroscopy procedure to address any meniscal pathology and a leukocyte poor PRP injection. Patient acknowledges their understanding and agreement with the plan.    Large Joint Aspiration/Injection: bilateral knee    Date/Time: 3/12/2024 2:30 PM    Performed by: Pascual Menchaca MD  Authorized by: Pascual Menchaca MD    Consent Done?:  Yes (Verbal)  Indications:  Pain  Timeout: prior to procedure the correct patient, procedure, and site was verified    Prep: patient was prepped and draped in usual sterile fashion      Details:  Needle Size:  25 G  Approach:  Anterolateral  Location:  Knee  Laterality:  Bilateral  Site:  Bilateral knee  Medications (Right):  3 mg LIDOcaine HCL 20 mg/ml (2%) 20 mg/mL (2 %); 40 mg methylPREDNISolone acetate 80 mg/mL  Medications (Left):  3 mg LIDOcaine HCL 20 mg/ml (2%) 20 mg/mL (2 %); 40 mg methylPREDNISolone acetate 80 mg/mL  Patient tolerance:  Patient tolerated the procedure well with no immediate complications              No follow-ups on file.      DISCLAIMER: This note may have been dictated using voice recognition software and may contain grammatical errors.     NOTE: Consult report sent to referring provider via [facility] [employer]MR.

## 2024-03-15 ENCOUNTER — LAB REQUISITION (OUTPATIENT)
Dept: LAB | Facility: HOSPITAL | Age: 44
End: 2024-03-15
Payer: COMMERCIAL

## 2024-03-15 DIAGNOSIS — A04.8 OTHER SPECIFIED BACTERIAL INTESTINAL INFECTIONS: ICD-10-CM

## 2024-03-15 DIAGNOSIS — K59.00 CONSTIPATION, UNSPECIFIED: ICD-10-CM

## 2024-03-15 DIAGNOSIS — D50.9 IRON DEFICIENCY ANEMIA, UNSPECIFIED: ICD-10-CM

## 2024-03-15 PROCEDURE — 87338 HPYLORI STOOL AG IA: CPT | Performed by: INTERNAL MEDICINE

## 2024-03-16 LAB — H. PYLORI STOOL: NEGATIVE

## 2024-03-20 ENCOUNTER — PATIENT MESSAGE (OUTPATIENT)
Dept: FAMILY MEDICINE | Facility: CLINIC | Age: 44
End: 2024-03-20
Payer: COMMERCIAL

## 2024-04-01 ENCOUNTER — LAB VISIT (OUTPATIENT)
Dept: LAB | Facility: HOSPITAL | Age: 44
End: 2024-04-01
Attending: FAMILY MEDICINE
Payer: COMMERCIAL

## 2024-04-01 DIAGNOSIS — D50.9 IRON DEFICIENCY ANEMIA, UNSPECIFIED IRON DEFICIENCY ANEMIA TYPE: ICD-10-CM

## 2024-04-01 DIAGNOSIS — D69.6 LOW PLATELET COUNT: ICD-10-CM

## 2024-04-01 LAB
BASOPHILS # BLD AUTO: 0.05 X10(3)/MCL
BASOPHILS NFR BLD AUTO: 0.5 %
EOSINOPHIL # BLD AUTO: 0.09 X10(3)/MCL (ref 0–0.9)
EOSINOPHIL NFR BLD AUTO: 0.9 %
ERYTHROCYTE [DISTWIDTH] IN BLOOD BY AUTOMATED COUNT: 15.6 % (ref 11.5–17)
HCT VFR BLD AUTO: 40.6 % (ref 37–47)
HGB BLD-MCNC: 12.7 G/DL (ref 12–16)
IMM GRANULOCYTES # BLD AUTO: 0.1 X10(3)/MCL (ref 0–0.04)
IMM GRANULOCYTES NFR BLD AUTO: 1 %
IRON SATN MFR SERPL: 15 % (ref 20–50)
IRON SERPL-MCNC: 50 UG/DL (ref 50–170)
LYMPHOCYTES # BLD AUTO: 2.18 X10(3)/MCL (ref 0.6–4.6)
LYMPHOCYTES NFR BLD AUTO: 21 %
MCH RBC QN AUTO: 27 PG (ref 27–31)
MCHC RBC AUTO-ENTMCNC: 31.3 G/DL (ref 33–36)
MCV RBC AUTO: 86.4 FL (ref 80–94)
MONOCYTES # BLD AUTO: 0.81 X10(3)/MCL (ref 0.1–1.3)
MONOCYTES NFR BLD AUTO: 7.8 %
NEUTROPHILS # BLD AUTO: 7.13 X10(3)/MCL (ref 2.1–9.2)
NEUTROPHILS NFR BLD AUTO: 68.8 %
NRBC BLD AUTO-RTO: 0 %
PLATELET # BLD AUTO: 152 X10(3)/MCL (ref 130–400)
PLATELETS.RETICULATED NFR BLD AUTO: 19.6 % (ref 0.9–11.2)
PMV BLD AUTO: ABNORMAL FL
RBC # BLD AUTO: 4.7 X10(6)/MCL (ref 4.2–5.4)
TIBC SERPL-MCNC: 289 UG/DL (ref 70–310)
TIBC SERPL-MCNC: 339 UG/DL (ref 250–450)
TRANSFERRIN SERPL-MCNC: 318 MG/DL (ref 180–382)
WBC # SPEC AUTO: 10.36 X10(3)/MCL (ref 4.5–11.5)

## 2024-04-01 PROCEDURE — 36415 COLL VENOUS BLD VENIPUNCTURE: CPT

## 2024-04-01 PROCEDURE — 85025 COMPLETE CBC W/AUTO DIFF WBC: CPT

## 2024-04-01 PROCEDURE — 83540 ASSAY OF IRON: CPT

## 2024-04-02 ENCOUNTER — OFFICE VISIT (OUTPATIENT)
Dept: FAMILY MEDICINE | Facility: CLINIC | Age: 44
End: 2024-04-02
Payer: COMMERCIAL

## 2024-04-02 VITALS
OXYGEN SATURATION: 99 % | SYSTOLIC BLOOD PRESSURE: 114 MMHG | HEIGHT: 64 IN | WEIGHT: 170 LBS | BODY MASS INDEX: 29.02 KG/M2 | DIASTOLIC BLOOD PRESSURE: 76 MMHG | HEART RATE: 89 BPM

## 2024-04-02 DIAGNOSIS — M25.562 CHRONIC PAIN OF BOTH KNEES: ICD-10-CM

## 2024-04-02 DIAGNOSIS — G89.29 CHRONIC PAIN OF BOTH KNEES: ICD-10-CM

## 2024-04-02 DIAGNOSIS — M25.561 CHRONIC PAIN OF BOTH KNEES: ICD-10-CM

## 2024-04-02 DIAGNOSIS — D69.6 LOW PLATELET COUNT: ICD-10-CM

## 2024-04-02 DIAGNOSIS — Z56.6 STRESS AT WORK: ICD-10-CM

## 2024-04-02 DIAGNOSIS — D50.9 IRON DEFICIENCY ANEMIA, UNSPECIFIED IRON DEFICIENCY ANEMIA TYPE: Primary | ICD-10-CM

## 2024-04-02 PROCEDURE — 3078F DIAST BP <80 MM HG: CPT | Mod: CPTII,,, | Performed by: FAMILY MEDICINE

## 2024-04-02 PROCEDURE — 1159F MED LIST DOCD IN RCRD: CPT | Mod: CPTII,,, | Performed by: FAMILY MEDICINE

## 2024-04-02 PROCEDURE — 99213 OFFICE O/P EST LOW 20 MIN: CPT | Mod: ,,, | Performed by: FAMILY MEDICINE

## 2024-04-02 PROCEDURE — 3074F SYST BP LT 130 MM HG: CPT | Mod: CPTII,,, | Performed by: FAMILY MEDICINE

## 2024-04-02 PROCEDURE — 1160F RVW MEDS BY RX/DR IN RCRD: CPT | Mod: CPTII,,, | Performed by: FAMILY MEDICINE

## 2024-04-02 PROCEDURE — 3008F BODY MASS INDEX DOCD: CPT | Mod: CPTII,,, | Performed by: FAMILY MEDICINE

## 2024-04-02 RX ORDER — FERROUS GLUCONATE 324(38)MG
324 TABLET ORAL 2 TIMES DAILY WITH MEALS
Qty: 60 TABLET | Refills: 2 | Status: SHIPPED | OUTPATIENT
Start: 2024-04-02 | End: 2024-07-01

## 2024-04-02 SDOH — SOCIAL DETERMINANTS OF HEALTH (SDOH): OTHER PHYSICAL AND MENTAL STRAIN RELATED TO WORK: Z56.6

## 2024-04-02 NOTE — PROGRESS NOTES
Patient ID: [id number]     Chief Complaint: Follow-up and vitamin defeincy        HPI:     Doreen Dorman is a 43 y.o. female here today for a follow up iron deficiency anemia.   - Here for followup of iron deficiency anemia, she is compliant with oral iron daily as prescribed and iron rich diet, she had labs done with 04/01/2024, here to discuss the results today. She is asymptomatic. She is UTD on pap smear and workup with her GYN (Dr. Ross) and she has heavy cycles, now on progesterone, she has NL Colonoscopy with GI (Dr. Cantu), she has a followup appointment with GYN in 05/01/2024. She would like Rx refill of Fergon today, no side effects.   - She reports history of low platelets, has seen Hematology, had negative workup, was told that her platelets stick together and they aren't low, she is asymptomatic.    - She reports increased stress from work x several weeks. She is not interested in Rx, but would like referral for outpatient counseling. She denies SI/HI or AH/VH.  - Patient complains of bilateral knee pain x several months, has seen Ortho (Dr. Menchaca), has injections done with some improvement, but would like referral to Dr. Mathis.   - Patient is without any other complaints today.         ----------------------------  Hyperthyroidism     Past Surgical History:   Procedure Laterality Date    CERVICAL SPINE SURGERY N/A 08/11/2022    NASAL SEPTOPLASTY         Review of patient's allergies indicates:  No Known Allergies    Outpatient Medications Marked as Taking for the 4/2/24 encounter (Office Visit) with Thelma Dougherty MD   Medication Sig Dispense Refill    butalbital-acetaminophen-caffeine -40 mg (FIORICET, ESGIC) -40 mg per tablet Take 1 tablet by mouth every 6 (six) hours as needed for Headaches. 30 tablet 2    clindamycin-benzoyl peroxide gel Apply 45 g topically once daily.      colestipoL (COLESTID) 1 gram Tab 60 tablets.      cyclobenzaprine (FLEXERIL) 10 MG tablet  Take 10 mg by mouth 2 (two) times daily as needed.      diclofenac sodium (VOLTAREN) 1 % Gel Apply 2 g topically 4 (four) times daily as needed (pain/inflammation). 100 g 0    methIMAzole (TAPAZOLE) 5 MG Tab Take 7.5 mg by mouth once daily.      nystatin (MYCOSTATIN) cream Apply 30 g topically 2 (two) times daily.      progesterone (PROMETRIUM) 200 MG capsule Take 200 mg by mouth every 14 (fourteen) days.      terbinafine HCL (LAMISIL) 250 mg tablet Take 250 mg by mouth once daily.      tiZANidine (ZANAFLEX) 2 MG tablet Take 2 mg by mouth 2 (two) times daily.      [DISCONTINUED] ferrous gluconate (FERGON) 324 MG tablet Take 1 tablet (324 mg total) by mouth 2 (two) times daily with meals. 60 tablet 2    [DISCONTINUED] tirzepatide, weight loss, (ZEPBOUND) 2.5 mg/0.5 mL PnIj Inject 2.5 mg into the skin every 7 days. 4 Pen 1       Social History     Socioeconomic History    Marital status: Single   Tobacco Use    Smoking status: Never     Passive exposure: Never    Smokeless tobacco: Never   Substance and Sexual Activity    Alcohol use: Not Currently    Drug use: Never    Sexual activity: Yes     Partners: Male     Social Determinants of Health     Financial Resource Strain: Medium Risk (2/21/2024)    Overall Financial Resource Strain (CARDIA)     Difficulty of Paying Living Expenses: Somewhat hard   Food Insecurity: Food Insecurity Present (2/21/2024)    Hunger Vital Sign     Worried About Running Out of Food in the Last Year: Sometimes true     Ran Out of Food in the Last Year: Sometimes true   Transportation Needs: No Transportation Needs (2/21/2024)    PRAPARE - Transportation     Lack of Transportation (Medical): No     Lack of Transportation (Non-Medical): No   Physical Activity: Sufficiently Active (2/21/2024)    Exercise Vital Sign     Days of Exercise per Week: 4 days     Minutes of Exercise per Session: 60 min   Stress: Stress Concern Present (2/21/2024)    [employer] -  Occupational Stress Questionnaire     Feeling of Stress : Very much   Social Connections: Moderately Integrated (4/2/2024)    Social Connection and Isolation Panel [NHANES]     Frequency of Communication with Friends and Family: Three times a week     Frequency of Social Gatherings with Friends and Family: Three times a week     Attends Jewish Services: More than 4 times per year     Active Member of Clubs or Organizations: Yes     Attends Club or Organization Meetings: 1 to 4 times per year     Marital Status: Never    Housing Stability: Low Risk  (2/21/2024)    Housing Stability Vital Sign     Unable to Pay for Housing in the Last Year: No     Number of Places Lived in the Last Year: 1     Unstable Housing in the Last Year: No        Family History   Problem Relation Age of Onset    No Known Problems Mother     No Known Problems Father     Breast cancer Maternal Aunt 45        maternal great - aunt        Subjective:       Review of Systems:    See HPI for details    Constitutional: Denies Change in appetite. Denies Chills. Denies Fever. Denies Night sweats.  Eye: Denies Blurred vision. Denies Discharge. Denies Eye pain.  ENT: Denies Decreased hearing. Denies Sore throat. Denies Swollen glands.  Respiratory: Denies Cough. Denies Shortness of breath. Denies Shortness of breath with exertion. Denies Wheezing.  Cardiovascular: Denies Chest pain at rest. Denies Chest pain with exertion. Denies Irregular heartbeat. Denies Palpitations.  Gastrointestinal: Denies Abdominal pain. Denies Diarrhea. Denies Nausea. Denies Vomiting. Denies Hematemesis or Hematochezia.  Genitourinary: Denies Dysuria. Denies Urinary frequency. Denies Urinary urgency. Denies Blood in urine.  Endocrine: Denies Cold intolerance. Denies Excessive thirst. Denies Heat intolerance. Denies Weight loss. Denies Weight gain.  Musculoskeletal: Reports Painful joints. Denies Weakness.  Integumentary: Denies Rash. Denies Itching. Denies Dry  "skin.  Neurologic: Denies Dizziness. Denies Fainting. Denies Headache.  Psychiatric: Denies Depression. Denies Anxiety. Denies Suicidal/Homicidal ideations.    All Other ROS: Negative except as stated in HPI.       Objective:     /76 (BP Location: Right arm, Patient Position: Sitting, BP Method: Medium (Automatic))   Pulse 89   Ht 5' 4" (1.626 m)   Wt 77.1 kg (170 lb)   LMP 03/10/2024   SpO2 99%   BMI 29.18 kg/m²     Physical Exam    General: Alert and oriented, No acute distress. Overweight.   Head: Normocephalic, Atraumatic.  Eye: Pupils are equal, round and reactive to light, Extraocular movements are intact, Sclera non-icteric.  Ears/Nose/Throat: Normal, Mucosa moist,Clear.  Neck/Thyroid: Supple, Non-tender, No carotid bruit, No palpable thyromegaly or thyroid nodule, No lymphadenopathy, No JVD, Full range of motion.  Respiratory: Clear to auscultation bilaterally; No wheezes, rales or rhonchi,Non-labored respirations, Symmetrical chest wall expansion.  Cardiovascular: Regular rate and rhythm, S1/S2 normal, No murmurs, rubs or gallops.  Gastrointestinal: Soft, Non-tender, Non-distended, Normal bowel sounds, No palpable organomegaly.  Musculoskeletal: Normal range of motion.  Integumentary: Warm, Dry, Intact, No suspicious lesions or rashes.  Extremities: No clubbing, cyanosis or edema  Neurologic: No focal deficits, Cranial Nerves II-XII are grossly intact, Motor strength normal upper and lower extremities, Sensory exam intact.  Psychiatric: Normal interaction, Coherent speech, Euthymic mood, Appropriate affect     *Lab results from 04/01/2024 were reviewed and discussed with patient and patient voices understanding.*        Assessment:       ICD-10-CM ICD-9-CM   1. Iron deficiency anemia, unspecified iron deficiency anemia type  D50.9 280.9   2. Low platelet count  D69.6 287.5   3. Stress at work  Z56.6 V62.1   4. Chronic pain of both knees  M25.561 719.46    M25.562 338.29    G89.29         Plan: "     Problem List Items Addressed This Visit          Hematology    Low platelet count       Oncology    Iron deficiency anemia - Primary    Relevant Medications    ferrous gluconate (FERGON) 324 MG tablet     Other Visit Diagnoses       Stress at work        Relevant Orders    Ambulatory referral/consult to Psychiatry    Chronic pain of both knees        Relevant Orders    Ambulatory referral/consult to Orthopedics         1. Iron deficiency anemia, unspecified iron deficiency anemia type  - ferrous gluconate (FERGON) 324 MG tablet; Take 1 tablet (324 mg total) by mouth 2 (two) times daily with meals.  Dispense: 60 tablet; Refill: 2  - Improving, continue iron as prescribed and iron rich diet, Rx Fergon refilled today. Recheck CBC and iron panel in 07/2024. Notify M.D. or ER if symptoms persist or worsen, active bleeding, temp >100.4, or any acute illness.      2. Low platelet count  - Improving, continue iron as prescribed and iron rich diet, Rx Fergon refilled today. Recheck CBC and iron panel in 07/2024. Notify M.D. or ER if symptoms persist or worsen, active bleeding, temp >100.4, or any acute illness.      3. Stress at work  - Ambulatory referral/consult to Psychiatry; Future for outpatient counseling  Start   /  Continue   Practice deep breathing or abdominal breathing exercises when anxiety occurs.  Exercise daily. Get sunlight daily.  Avoid caffeine, alcohol and stimulants.  Practice positive phrases and repeat throughout the day, yoga, lavender scents or Chamomile tea will help anxiety.  Set healthy boundaries, avoid people and conversations that increase stress.  Reports any symptoms of suicidal or homicidal ideations immediately, if clinic is closed go to nearest emergency room.     4. Chronic pain of both knees  - Ambulatory referral/consult to Orthopedics; Future for evaluation and treatment.         Doreen was seen today for follow-up and vitamin defeincy.    Diagnoses and all orders for this  visit:    Iron deficiency anemia, unspecified iron deficiency anemia type  -     ferrous gluconate (FERGON) 324 MG tablet; Take 1 tablet (324 mg total) by mouth 2 (two) times daily with meals.    Low platelet count    Stress at work  -     Ambulatory referral/consult to Psychiatry; Future    Chronic pain of both knees  -     Ambulatory referral/consult to Orthopedics; Future          Medication List with Changes/Refills   Current Medications    BUTALBITAL-ACETAMINOPHEN-CAFFEINE -40 MG (FIORICET, ESGIC) -40 MG PER TABLET    Take 1 tablet by mouth every 6 (six) hours as needed for Headaches.       Start Date: 11/13/2023End Date: --    CLINDAMYCIN-BENZOYL PEROXIDE GEL    Apply 45 g topically once daily.       Start Date: 4/13/2023 End Date: --    COLESTIPOL (COLESTID) 1 GRAM TAB    60 tablets.       Start Date: 3/11/2024 End Date: --    CYCLOBENZAPRINE (FLEXERIL) 10 MG TABLET    Take 10 mg by mouth 2 (two) times daily as needed.       Start Date: 10/20/2022End Date: --    DICLOFENAC SODIUM (VOLTAREN) 1 % GEL    Apply 2 g topically 4 (four) times daily as needed (pain/inflammation).       Start Date: 2/21/2024 End Date: --    METHIMAZOLE (TAPAZOLE) 5 MG TAB    Take 7.5 mg by mouth once daily.       Start Date: 4/14/2023 End Date: --    NYSTATIN (MYCOSTATIN) CREAM    Apply 30 g topically 2 (two) times daily.       Start Date: 4/13/2023 End Date: --    PROGESTERONE (PROMETRIUM) 200 MG CAPSULE    Take 200 mg by mouth every 14 (fourteen) days.       Start Date: 1/29/2024 End Date: --    TERBINAFINE HCL (LAMISIL) 250 MG TABLET    Take 250 mg by mouth once daily.       Start Date: 4/13/2023 End Date: --    TIZANIDINE (ZANAFLEX) 2 MG TABLET    Take 2 mg by mouth 2 (two) times daily.       Start Date: 4/24/2023 End Date: --   Changed and/or Refilled Medications    Modified Medication Previous Medication    FERROUS GLUCONATE (FERGON) 324 MG TABLET ferrous gluconate (FERGON) 324 MG tablet       Take 1 tablet (324 mg  total) by mouth 2 (two) times daily with meals.    Take 1 tablet (324 mg total) by mouth 2 (two) times daily with meals.       Start Date: 4/2/2024  End Date: 7/1/2024    Start Date: 2/21/2024 End Date: 4/2/2024   Discontinued Medications    TIRZEPATIDE, WEIGHT LOSS, (ZEPBOUND) 2.5 MG/0.5 ML PNIJ    Inject 2.5 mg into the skin every 7 days.       Start Date: 2/21/2024 End Date: 4/2/2024          Follow up in about 17 weeks (around 7/30/2024) for Wellness, followup iron def anemia.

## 2024-04-15 ENCOUNTER — TELEPHONE (OUTPATIENT)
Dept: FAMILY MEDICINE | Facility: CLINIC | Age: 44
End: 2024-04-15
Payer: COMMERCIAL

## 2024-04-17 ENCOUNTER — OFFICE VISIT (OUTPATIENT)
Dept: FAMILY MEDICINE | Facility: CLINIC | Age: 44
End: 2024-04-17
Payer: COMMERCIAL

## 2024-04-17 VITALS
HEART RATE: 70 BPM | WEIGHT: 171.5 LBS | OXYGEN SATURATION: 99 % | BODY MASS INDEX: 29.28 KG/M2 | HEIGHT: 64 IN | SYSTOLIC BLOOD PRESSURE: 103 MMHG | DIASTOLIC BLOOD PRESSURE: 71 MMHG

## 2024-04-17 DIAGNOSIS — H00.021 HORDEOLUM INTERNUM OF RIGHT UPPER EYELID: Primary | ICD-10-CM

## 2024-04-17 PROBLEM — E66.3 OVERWEIGHT (BMI 25.0-29.9): Status: ACTIVE | Noted: 2024-04-17

## 2024-04-17 PROCEDURE — 3078F DIAST BP <80 MM HG: CPT | Mod: CPTII,,,

## 2024-04-17 PROCEDURE — 1160F RVW MEDS BY RX/DR IN RCRD: CPT | Mod: CPTII,,,

## 2024-04-17 PROCEDURE — 1159F MED LIST DOCD IN RCRD: CPT | Mod: CPTII,,,

## 2024-04-17 PROCEDURE — 3074F SYST BP LT 130 MM HG: CPT | Mod: CPTII,,,

## 2024-04-17 PROCEDURE — 99213 OFFICE O/P EST LOW 20 MIN: CPT | Mod: ,,,

## 2024-04-17 PROCEDURE — 3008F BODY MASS INDEX DOCD: CPT | Mod: CPTII,,,

## 2024-04-17 RX ORDER — ERYTHROMYCIN 5 MG/G
OINTMENT OPHTHALMIC 3 TIMES DAILY
Qty: 1 EACH | Refills: 1 | Status: SHIPPED | OUTPATIENT
Start: 2024-04-17

## 2024-04-17 NOTE — PROGRESS NOTES
Patient ID: [id number]     Chief Complaint: Bump on right eye.    HPI:     Doreen Dorman is a 43 y.o. female here today for a bump on her right eye. Symptoms of her right eye started about 1 week ago. She reports eyelid swelling, tearing and redness. She denies blurred vision, denies left eye discomfort, or pain. Self treated with a cold compress. No other complaints today.     Past Medical History:   Diagnosis Date    Hyperthyroidism         Past Surgical History:   Procedure Laterality Date    CERVICAL SPINE SURGERY N/A 08/11/2022    NASAL SEPTOPLASTY          Social History     Tobacco Use    Smoking status: Never     Passive exposure: Never    Smokeless tobacco: Never   Substance and Sexual Activity    Alcohol use: Not Currently    Drug use: Never    Sexual activity: Yes     Partners: Male        Current Outpatient Medications   Medication Instructions    butalbital-acetaminophen-caffeine -40 mg (FIORICET, ESGIC) -40 mg per tablet 1 tablet, Oral, Every 6 hours PRN    clindamycin-benzoyl peroxide gel 45 g, Topical (Top), Daily    colestipoL (COLESTID) 1 gram Tab 60 tablets    cyclobenzaprine (FLEXERIL) 10 mg, Oral, 2 times daily PRN    diclofenac sodium (VOLTAREN) 2 g, Topical (Top), 4 times daily PRN    erythromycin (ROMYCIN) ophthalmic ointment Right Eye, 3 times daily    ferrous gluconate (FERGON) 324 mg, Oral, 2 times daily with meals    methIMAzole (TAPAZOLE) 7.5 mg, Oral, Daily    nystatin (MYCOSTATIN) 30 g, Topical (Top), 2 times daily    progesterone (PROMETRIUM) 200 mg, Oral, Every 14 days    terbinafine HCL (LAMISIL) 250 mg, Oral, Daily    tiZANidine (ZANAFLEX) 2 mg, Oral, 2 times daily       Review of patient's allergies indicates:  No Known Allergies     Patient Care Team:  Thelma Dougherty MD as PCP - General (Family Medicine)     Subjective:     Review of Systems    12 point review of systems conducted, negative except as stated in the history of present illness. See HPI  "for details.    Objective:     Visit Vitals  /71 (BP Location: Right arm, Patient Position: Sitting, BP Method: Large (Automatic))   Pulse 70   Ht 5' 4" (1.626 m)   Wt 77.8 kg (171 lb 8 oz)   LMP 04/09/2024 (Exact Date)   SpO2 99%   BMI 29.44 kg/m²       Physical Exam  Constitutional:       General: She is not in acute distress.     Appearance: She is not ill-appearing.   HENT:      Head: Normocephalic.      Ears:      Comments: No evidence of infection beyond the localized nodule.  Eyes:      General: Lids are everted, no foreign bodies appreciated. Vision grossly intact. Gaze aligned appropriately.      Extraocular Movements: Extraocular movements intact.      Conjunctiva/sclera:      Right eye: No exudate.     Left eye: No exudate.     Pupils: Pupils are equal, round, and reactive to light.   Cardiovascular:      Rate and Rhythm: Normal rate and regular rhythm.   Pulmonary:      Effort: Pulmonary effort is normal.      Breath sounds: Normal breath sounds.   Abdominal:      Palpations: Abdomen is soft.   Musculoskeletal:         General: Normal range of motion.   Skin:     General: Skin is warm and dry.      Capillary Refill: Capillary refill takes less than 2 seconds.   Neurological:      General: No focal deficit present.      Mental Status: She is alert and oriented to person, place, and time.   Psychiatric:         Mood and Affect: Mood normal.         Behavior: Behavior normal.         Thought Content: Thought content normal.         Judgment: Judgment normal.       Labs Reviewed:      Hematology:  Lab Results   Component Value Date    WBC 10.36 04/01/2024    HGB 12.7 04/01/2024    HCT 40.6 04/01/2024     04/01/2024        Assessment:       ICD-10-CM ICD-9-CM   1. Hordeolum internum of right upper eyelid  H00.021 373.12        Plan:     1. Hordeolum internum of right upper eyelid  Assessment & Plan:  Erythromycin ophthalmic ointment, place in right eye three times daily.   Warm compresses 2-3 " times per day until it drains.  Good hand hygiene.  Avoid wearing contacts until hordeolum resolves.   Avoid applying makeup and discard current eye makeup.  Notify provider if symptoms do not improve.   Go to urgent care or emergency department for symptoms of infection going beyond the localized area, vision change, increased pain, fever >100.4, chest pain, shortness of breath, or any acute illness.   Work excuse provided.     Orders:  -     erythromycin (ROMYCIN) ophthalmic ointment; Place into the right eye 3 (three) times daily.  Dispense: 1 each; Refill: 1         Follow up if symptoms worsen or fail to improve, for Keep all future appointments as scheduled. . In addition to their scheduled follow up, the patient has also been instructed to follow up on as needed basis.     Future Appointments   Date Time Provider Department Center   5/14/2024  2:30 PM Pascual Menchaca MD [facility] [place]   5/21/2024 10:15 AM [facility] [id number] DX2 [facility] [place]ENE [place]   5/21/2024 11:00 AM [facility] US2 [facility] US [place]   7/30/2024  1:30 PM Thelma Dougherty MD [facility] [place]         FIONA Singer

## 2024-04-17 NOTE — ASSESSMENT & PLAN NOTE
Erythromycin ophthalmic ointment, place in right eye three times daily.   Warm compresses 2-3 times per day until it drains.  Good hand hygiene.  Avoid wearing contacts until hordeolum resolves.   Avoid applying makeup and discard current eye makeup.  Notify provider if symptoms do not improve.   Go to urgent care or emergency department for symptoms of infection going beyond the localized area, vision change, increased pain, fever >100.4, chest pain, shortness of breath, or any acute illness.   Work excuse provided.

## 2024-05-21 ENCOUNTER — HOSPITAL ENCOUNTER (OUTPATIENT)
Dept: RADIOLOGY | Facility: HOSPITAL | Age: 44
Discharge: HOME | End: 2024-05-21
Attending: FAMILY MEDICINE
Payer: COMMERCIAL

## 2024-05-21 DIAGNOSIS — R92.8 ABNORMAL MAMMOGRAM: ICD-10-CM

## 2024-05-21 PROCEDURE — 76642 ULTRASOUND BREAST LIMITED: CPT | Mod: 26,LT,, | Performed by: STUDENT IN AN ORGANIZED HEALTH CARE EDUCATION/TRAINING PROGRAM

## 2024-05-21 PROCEDURE — 77066 DX MAMMO INCL CAD BI: CPT | Mod: TC

## 2024-05-21 PROCEDURE — 76642 ULTRASOUND BREAST LIMITED: CPT | Mod: TC,LT

## 2024-05-21 PROCEDURE — 77062 BREAST TOMOSYNTHESIS BI: CPT | Mod: 26,,, | Performed by: STUDENT IN AN ORGANIZED HEALTH CARE EDUCATION/TRAINING PROGRAM

## 2024-05-21 PROCEDURE — 77066 DX MAMMO INCL CAD BI: CPT | Mod: 26,,, | Performed by: STUDENT IN AN ORGANIZED HEALTH CARE EDUCATION/TRAINING PROGRAM

## 2024-05-22 DIAGNOSIS — N63.20 MASS OF LEFT BREAST, UNSPECIFIED QUADRANT: Primary | ICD-10-CM

## 2024-05-23 ENCOUNTER — PATIENT MESSAGE (OUTPATIENT)
Dept: FAMILY MEDICINE | Facility: CLINIC | Age: 44
End: 2024-05-23
Payer: COMMERCIAL

## 2024-05-27 ENCOUNTER — PATIENT MESSAGE (OUTPATIENT)
Dept: FAMILY MEDICINE | Facility: CLINIC | Age: 44
End: 2024-05-27
Payer: COMMERCIAL

## 2024-05-29 DIAGNOSIS — R06.83 SNORING: Primary | ICD-10-CM

## 2024-05-29 DIAGNOSIS — G47.19 EXCESSIVE DAYTIME SLEEPINESS: ICD-10-CM

## 2024-07-15 ENCOUNTER — PATIENT MESSAGE (OUTPATIENT)
Dept: FAMILY MEDICINE | Facility: CLINIC | Age: 44
End: 2024-07-15

## 2024-07-15 ENCOUNTER — E-VISIT (OUTPATIENT)
Dept: FAMILY MEDICINE | Facility: CLINIC | Age: 44
End: 2024-07-15
Payer: COMMERCIAL

## 2024-07-15 ENCOUNTER — TELEPHONE (OUTPATIENT)
Dept: FAMILY MEDICINE | Facility: CLINIC | Age: 44
End: 2024-07-15
Payer: COMMERCIAL

## 2024-07-15 DIAGNOSIS — U07.1 COVID-19: Primary | ICD-10-CM

## 2024-07-15 PROCEDURE — 99421 OL DIG E/M SVC 5-10 MIN: CPT | Mod: ,,, | Performed by: FAMILY MEDICINE

## 2024-07-15 RX ORDER — NIRMATRELVIR AND RITONAVIR 300-100 MG
KIT ORAL
Qty: 30 TABLET | Refills: 0 | Status: SHIPPED | OUTPATIENT
Start: 2024-07-15 | End: 2024-07-20

## 2024-07-15 RX ORDER — ALBUTEROL SULFATE 90 UG/1
2 AEROSOL, METERED RESPIRATORY (INHALATION) EVERY 6 HOURS PRN
Qty: 18 G | Refills: 0 | Status: SHIPPED | OUTPATIENT
Start: 2024-07-15 | End: 2025-07-15

## 2024-07-15 NOTE — TELEPHONE ENCOUNTER
----- Message from Marcella Dixon sent at 7/15/2024 11:32 AM CDT -----  .Who Called: Doreen Dorman        Preferred Method of Contact: Phone Call  Patient's Preferred Phone Number on File: [phone]   Best Call Back Number, if different:  Additional Information: pt tested positive last Wednesday for covid and asking or nurse call her

## 2024-07-15 NOTE — PROGRESS NOTES
Patient ID: Doreen Dorman is a 43 y.o. female.    Chief Complaint: COVID-19 Concerns    The patient initiated a request through [employer] on 7/15/2024 for evaluation and management with a chief complaint of COVID-19 Concerns     I evaluated the questionnaire submission on 07/15/2024.    [facility]    7/15/2024  1:45 PM CDT - Filed by Patient   Do you agree to participate in an E-Visit? Yes   If you have any of the following symptoms, please present to your local emergency room or call 911:  I acknowledge   Are you pregnant, could you be pregnant, or are you breast feeding? None of the above   What is the main issue you would like addressed today? COVID   Please describe your symptoms Runny nose, burning nose, shortness of breath, headache, lightheaded, and fatigue!   Where is your problem located? Every where   How severe are your symptoms? Moderate   Have you had these symptoms before? Yes   How long have you been having these symptoms? For a week   Please list any medications or treatments you have used for your condition and indicate if it was effective or not. Visit [facility] on 7/8/24 and 7/10/24. Received a Zpack and Nasoq   What makes this feel better? Sleep and rest   What makes this feel worse? Moderate activity   Are these symptoms related to a condition that you currently have? Yes   What is the condition? COVID   When were you last seen for this condition? 7/10/2024   Please describe any probable cause for these symptoms Runny nose, burning nose, shortness of breath, headache, lightheaded, and fatigue!   Provide any additional information you feel is important. N/a   Please attach any relevant images or files    Are you able to take your vital signs? No         Encounter Diagnosis   Name Primary?    COVID-19 Yes        No orders of the defined types were placed in this encounter.     Medications Ordered This Encounter   Medications    albuterol (VENTOLIN HFA) 90 mcg/actuation inhaler      Sig: Inhale 2 puffs into the lungs every 6 (six) hours as needed for Wheezing. Rescue     Dispense:  18 g     Refill:  0    nirmatrelvir-ritonavir (PAXLOVID) 300 mg (150 mg x 2)-100 mg copackaged tablets (EUA)     Sig: Take 3 tablets by mouth 2 (two) times daily. Each dose contains 2 nirmatrelvir (pink tablets) and 1 ritonavir (white tablet). Take all 3 tablets together     Dispense:  30 tablet     Refill:  0      Increase fluid intake. Self quarantine for 5 days. I sent an antiviral (Paxlovid), and an inhaler (Albuterol) to help her symptoms. If symptoms are refractory to treatment, will proceed with obtaining a Chest Xray. Notify M.D. or report to nearest ER if symptoms persist or worsen, shortness of breath, chest pain/tightness, coughing up blood, temp >100.4, or any acute illness.      Follow up if symptoms worsen or fail to improve.      E-Visit Time Tracking:    Day 1 Time (in minutes): 5    Total Time (in minutes): 5

## 2024-07-24 ENCOUNTER — TELEPHONE (OUTPATIENT)
Dept: FAMILY MEDICINE | Facility: CLINIC | Age: 44
End: 2024-07-24
Payer: COMMERCIAL

## 2024-07-24 DIAGNOSIS — Z00.00 LABORATORY EXAMINATION ORDERED AS PART OF A ROUTINE GENERAL MEDICAL EXAMINATION: Primary | ICD-10-CM

## 2024-07-24 NOTE — TELEPHONE ENCOUNTER
Left message confirming pts appt 07/30/2024 @ 1:30    Are there any outstanding tasks in the patients's chart (ex.labs,MM,etc)?  no  Do we have outstanding/pending referrals?  no  Has the patient been seen in and ER,UCC, or been admitted since last visit?'  no  Has the patient seen any other health care provider(doctors) since last visit?  no  Has the patient had any bloodwork or x-rays done since last visit?  no

## 2024-07-29 ENCOUNTER — LAB VISIT (OUTPATIENT)
Dept: LAB | Facility: HOSPITAL | Age: 44
End: 2024-07-29
Attending: FAMILY MEDICINE
Payer: COMMERCIAL

## 2024-07-29 DIAGNOSIS — Z00.00 LABORATORY EXAMINATION ORDERED AS PART OF A ROUTINE GENERAL MEDICAL EXAMINATION: ICD-10-CM

## 2024-07-29 LAB
25(OH)D3+25(OH)D2 SERPL-MCNC: 16 NG/ML (ref 30–80)
ALBUMIN SERPL-MCNC: 4 G/DL (ref 3.5–5)
ALBUMIN/GLOB SERPL: 1.3 RATIO (ref 1.1–2)
ALP SERPL-CCNC: 69 UNIT/L (ref 40–150)
ALT SERPL-CCNC: 19 UNIT/L (ref 0–55)
ANION GAP SERPL CALC-SCNC: 9 MEQ/L
AST SERPL-CCNC: 21 UNIT/L (ref 5–34)
BACTERIA #/AREA URNS AUTO: ABNORMAL /HPF
BASOPHILS # BLD AUTO: 0.05 X10(3)/MCL
BASOPHILS NFR BLD AUTO: 0.6 %
BILIRUB SERPL-MCNC: 0.7 MG/DL
BILIRUB UR QL STRIP.AUTO: NEGATIVE
BUN SERPL-MCNC: 10.5 MG/DL (ref 7–18.7)
CALCIUM SERPL-MCNC: 9.4 MG/DL (ref 8.4–10.2)
CHLORIDE SERPL-SCNC: 108 MMOL/L (ref 98–107)
CHOLEST SERPL-MCNC: 205 MG/DL
CHOLEST/HDLC SERPL: 4 {RATIO} (ref 0–5)
CLARITY UR: CLEAR
CO2 SERPL-SCNC: 28 MMOL/L (ref 22–29)
COLOR UR AUTO: YELLOW
CREAT SERPL-MCNC: 0.85 MG/DL (ref 0.55–1.02)
CREAT/UREA NIT SERPL: 12
EOSINOPHIL # BLD AUTO: 0.1 X10(3)/MCL (ref 0–0.9)
EOSINOPHIL NFR BLD AUTO: 1.3 %
ERYTHROCYTE [DISTWIDTH] IN BLOOD BY AUTOMATED COUNT: 15 % (ref 11.5–17)
EST. AVERAGE GLUCOSE BLD GHB EST-MCNC: 91.1 MG/DL
GFR SERPLBLD CREATININE-BSD FMLA CKD-EPI: >60 ML/MIN/1.73/M2
GLOBULIN SER-MCNC: 3 GM/DL (ref 2.4–3.5)
GLUCOSE SERPL-MCNC: 96 MG/DL (ref 74–100)
GLUCOSE UR QL STRIP: NORMAL
HBA1C MFR BLD: 4.8 %
HCT VFR BLD AUTO: 35.2 % (ref 37–47)
HDLC SERPL-MCNC: 46 MG/DL (ref 35–60)
HGB BLD-MCNC: 10.9 G/DL (ref 12–16)
HGB UR QL STRIP: ABNORMAL
IMM GRANULOCYTES # BLD AUTO: 0.1 X10(3)/MCL (ref 0–0.04)
IMM GRANULOCYTES NFR BLD AUTO: 1.3 %
KETONES UR QL STRIP: NEGATIVE
LDLC SERPL CALC-MCNC: 143 MG/DL (ref 50–140)
LEUKOCYTE ESTERASE UR QL STRIP: NEGATIVE
LYMPHOCYTES # BLD AUTO: 2.1 X10(3)/MCL (ref 0.6–4.6)
LYMPHOCYTES NFR BLD AUTO: 27 %
MCH RBC QN AUTO: 26.7 PG (ref 27–31)
MCHC RBC AUTO-ENTMCNC: 31 G/DL (ref 33–36)
MCV RBC AUTO: 86.1 FL (ref 80–94)
MONOCYTES # BLD AUTO: 0.43 X10(3)/MCL (ref 0.1–1.3)
MONOCYTES NFR BLD AUTO: 5.5 %
MUCOUS THREADS URNS QL MICRO: ABNORMAL /LPF
NEUTROPHILS # BLD AUTO: 4.99 X10(3)/MCL (ref 2.1–9.2)
NEUTROPHILS NFR BLD AUTO: 64.3 %
NITRITE UR QL STRIP: NEGATIVE
NRBC BLD AUTO-RTO: 0 %
PH UR STRIP: 5.5 [PH]
PLATELET # BLD AUTO: 133 X10(3)/MCL (ref 130–400)
PLATELETS.RETICULATED NFR BLD AUTO: 21.2 % (ref 0.9–11.2)
PMV BLD AUTO: ABNORMAL FL
POTASSIUM SERPL-SCNC: 4.1 MMOL/L (ref 3.5–5.1)
PROT SERPL-MCNC: 7 GM/DL (ref 6.4–8.3)
PROT UR QL STRIP: ABNORMAL
RBC # BLD AUTO: 4.09 X10(6)/MCL (ref 4.2–5.4)
RBC #/AREA URNS AUTO: ABNORMAL /HPF
SODIUM SERPL-SCNC: 145 MMOL/L (ref 136–145)
SP GR UR STRIP.AUTO: 1.03 (ref 1–1.03)
SQUAMOUS #/AREA URNS LPF: ABNORMAL /HPF
TRIGL SERPL-MCNC: 82 MG/DL (ref 37–140)
TSH SERPL-ACNC: 1.61 UIU/ML (ref 0.35–4.94)
UROBILINOGEN UR STRIP-ACNC: NORMAL
VLDLC SERPL CALC-MCNC: 16 MG/DL
WBC # BLD AUTO: 7.77 X10(3)/MCL (ref 4.5–11.5)
WBC #/AREA URNS AUTO: ABNORMAL /HPF

## 2024-07-29 PROCEDURE — 36415 COLL VENOUS BLD VENIPUNCTURE: CPT

## 2024-07-29 PROCEDURE — 85025 COMPLETE CBC W/AUTO DIFF WBC: CPT

## 2024-07-29 PROCEDURE — 80053 COMPREHEN METABOLIC PANEL: CPT

## 2024-07-29 PROCEDURE — 80061 LIPID PANEL: CPT

## 2024-07-29 PROCEDURE — 82306 VITAMIN D 25 HYDROXY: CPT

## 2024-07-29 PROCEDURE — 84443 ASSAY THYROID STIM HORMONE: CPT

## 2024-07-29 PROCEDURE — 83036 HEMOGLOBIN GLYCOSYLATED A1C: CPT

## 2024-07-29 PROCEDURE — 81001 URINALYSIS AUTO W/SCOPE: CPT

## 2024-07-30 ENCOUNTER — OFFICE VISIT (OUTPATIENT)
Dept: FAMILY MEDICINE | Facility: CLINIC | Age: 44
End: 2024-07-30
Payer: COMMERCIAL

## 2024-07-30 VITALS
HEART RATE: 86 BPM | BODY MASS INDEX: 29.53 KG/M2 | WEIGHT: 173 LBS | RESPIRATION RATE: 16 BRPM | DIASTOLIC BLOOD PRESSURE: 72 MMHG | SYSTOLIC BLOOD PRESSURE: 118 MMHG | OXYGEN SATURATION: 97 % | HEIGHT: 64 IN

## 2024-07-30 DIAGNOSIS — Z56.6 STRESS AT WORK: ICD-10-CM

## 2024-07-30 DIAGNOSIS — D50.9 IRON DEFICIENCY ANEMIA, UNSPECIFIED IRON DEFICIENCY ANEMIA TYPE: ICD-10-CM

## 2024-07-30 DIAGNOSIS — E55.9 VITAMIN D DEFICIENCY: ICD-10-CM

## 2024-07-30 DIAGNOSIS — Z00.00 WELLNESS EXAMINATION: Primary | ICD-10-CM

## 2024-07-30 DIAGNOSIS — N60.02 BENIGN BREAST CYST IN FEMALE, LEFT: ICD-10-CM

## 2024-07-30 DIAGNOSIS — R31.21 ASYMPTOMATIC MICROSCOPIC HEMATURIA: ICD-10-CM

## 2024-07-30 DIAGNOSIS — E78.2 MIXED HYPERLIPIDEMIA: ICD-10-CM

## 2024-07-30 PROCEDURE — 3044F HG A1C LEVEL LT 7.0%: CPT | Mod: CPTII,,, | Performed by: FAMILY MEDICINE

## 2024-07-30 PROCEDURE — 99396 PREV VISIT EST AGE 40-64: CPT | Mod: ,,, | Performed by: FAMILY MEDICINE

## 2024-07-30 PROCEDURE — 1160F RVW MEDS BY RX/DR IN RCRD: CPT | Mod: CPTII,,, | Performed by: FAMILY MEDICINE

## 2024-07-30 PROCEDURE — 3074F SYST BP LT 130 MM HG: CPT | Mod: CPTII,,, | Performed by: FAMILY MEDICINE

## 2024-07-30 PROCEDURE — 3008F BODY MASS INDEX DOCD: CPT | Mod: CPTII,,, | Performed by: FAMILY MEDICINE

## 2024-07-30 PROCEDURE — 3078F DIAST BP <80 MM HG: CPT | Mod: CPTII,,, | Performed by: FAMILY MEDICINE

## 2024-07-30 PROCEDURE — 1159F MED LIST DOCD IN RCRD: CPT | Mod: CPTII,,, | Performed by: FAMILY MEDICINE

## 2024-07-30 RX ORDER — ERGOCALCIFEROL 1.25 MG/1
50000 CAPSULE ORAL
Qty: 12 CAPSULE | Refills: 0 | Status: SHIPPED | OUTPATIENT
Start: 2024-07-30 | End: 2024-10-28

## 2024-07-30 SDOH — SOCIAL DETERMINANTS OF HEALTH (SDOH): OTHER PHYSICAL AND MENTAL STRAIN RELATED TO WORK: Z56.6

## 2024-07-30 NOTE — PROGRESS NOTES
Patient ID: [id number]     Chief Complaint: Annual Exam        HPI:     Doreen Dorman is a 44 y.o. female here today for annual wellness exam.   The patient presents for well adult exam.    The patient's general health status is described as good.    The patient's diet is described as balanced.    Exercise: occasional.    Associated symptoms consist of denies weight loss, denies weight gain, denies fatigue, denies headache, denies hearing loss and denies vision changes.    Additional pertinent history: last dental exam: goes every 6 months, last eye exam: 12/2023 (wears contacts/glasses, goes annually).  LMP: 07/25/2024, regular  Last pap smear: 10/05/2023 with GYN (Dr. Sharon Ross)  - Last MMG/breast US: 05/21/2024, she has a cyst in her left breast, would like referral to breast specialist.   - She had labs done on 07/29/2024.  - She has hyperthyroidism, asymptomatic with Rx, followed by Endocrinology (Dr. Vilchis).   - She has chronic neck and back pain, stable, followed by Neurosurgery (Dr. Andrews).  - She has ENT appointment on 08/13/2024 for sleep study.   - She reports anemia x 4-5 years, hasn't had any workup. She denies bleeding. She has tried iron rich diet without success, she denies fatigue with anemia. She denies family history of GI cancer. She does have a history of hemorrhoids followed by GI (Dr. Cantu).   - She has work-induced stress that causes headaches at times, she does well with intermittent time off work, she will bring FMLA forms.   - Patient is without any other complaints today.       Advance Care Planning     Date: 07/30/2024  Patient did not wish or was not able to name a surrogate decision maker or provide an Advance Care Plan.             -------------------------------------    Hyperthyroidism        Past Surgical History:   Procedure Laterality Date    CERVICAL SPINE SURGERY N/A 08/11/2022    NASAL SEPTOPLASTY         Review of patient's allergies indicates:  No Known  Allergies    Outpatient Medications Marked as Taking for the 7/30/24 encounter (Office Visit) with Thelma Dougherty MD   Medication Sig Dispense Refill    albuterol (VENTOLIN HFA) 90 mcg/actuation inhaler Inhale 2 puffs into the lungs every 6 (six) hours as needed for Wheezing. Rescue 18 g 0    butalbital-acetaminophen-caffeine -40 mg (FIORICET, ESGIC) -40 mg per tablet Take 1 tablet by mouth every 6 (six) hours as needed for Headaches. 30 tablet 2    clindamycin-benzoyl peroxide gel Apply 45 g topically once daily.      cyclobenzaprine (FLEXERIL) 10 MG tablet Take 10 mg by mouth 2 (two) times daily as needed.      diclofenac sodium (VOLTAREN) 1 % Gel Apply 2 g topically 4 (four) times daily as needed (pain/inflammation). 100 g 0    methIMAzole (TAPAZOLE) 5 MG Tab Take 7.5 mg by mouth once daily.      nystatin (MYCOSTATIN) cream Apply 30 g topically 2 (two) times daily.      progesterone (PROMETRIUM) 200 MG capsule Take 200 mg by mouth every 14 (fourteen) days.      terbinafine HCL (LAMISIL) 250 mg tablet Take 250 mg by mouth once daily.      tiZANidine (ZANAFLEX) 2 MG tablet Take 2 mg by mouth 2 (two) times daily.         Social History     Socioeconomic History    Marital status: Single   Tobacco Use    Smoking status: Never     Passive exposure: Never    Smokeless tobacco: Never   Substance and Sexual Activity    Alcohol use: Not Currently    Drug use: Never    Sexual activity: Yes     Partners: Male     Social Determinants of Health     Financial Resource Strain: Medium Risk (2/21/2024)    Overall Financial Resource Strain (CARDIA)     Difficulty of Paying Living Expenses: Somewhat hard   Food Insecurity: Food Insecurity Present (2/21/2024)    Hunger Vital Sign     Worried About Running Out of Food in the Last Year: Sometimes true     Ran Out of Food in the Last Year: Sometimes true   Transportation Needs: No Transportation Needs (2/21/2024)    PRAPARE - Transportation     Lack of  Transportation (Medical): No     Lack of Transportation (Non-Medical): No   Physical Activity: Sufficiently Active (2/21/2024)    Exercise Vital Sign     Days of Exercise per Week: 4 days     Minutes of Exercise per Session: 60 min   Stress: Stress Concern Present (2/21/2024)    [employer] [place] [employer] - Occupational Stress Questionnaire     Feeling of Stress : Very much   Housing Stability: Low Risk  (2/21/2024)    Housing Stability Vital Sign     Unable to Pay for Housing in the Last Year: No     Number of Places Lived in the Last Year: 1     Unstable Housing in the Last Year: No        Family History   Problem Relation Name Age of Onset    No Known Problems Mother      No Known Problems Father      Breast cancer Maternal Aunt  45        maternal great - aunt        Subjective:       Review of Systems:    See HPI for details    Constitutional: Denies Change in appetite. Denies Chills. Denies Fever. Denies Night sweats.  Eye: Denies Blurred vision. Denies Discharge. Denies Eye pain.  ENT: Denies Decreased hearing. Denies Sore throat. Denies Swollen glands.  Respiratory: Denies Cough. Denies Shortness of breath. Denies Shortness of breath with exertion. Denies Wheezing.  Cardiovascular: Denies Chest pain at rest. Denies Chest pain with exertion. Denies Irregular heartbeat. Denies Palpitations.  Gastrointestinal: Denies Abdominal pain. Denies Diarrhea. Denies Nausea. Denies Vomiting. Denies Hematemesis or Hematochezia.  Genitourinary: Denies Dysuria. Denies Urinary frequency. Denies Urinary urgency. Denies Blood in urine.  Endocrine: Denies Cold intolerance. Denies Excessive thirst. Denies Heat intolerance. Denies Weight loss. Denies Weight gain.  Musculoskeletal: Denies Painful joints. Denies Weakness.  Integumentary: Denies Rash. Denies Itching. Denies Dry skin.  Neurologic: Denies Dizziness. Denies Fainting. Denies Headache.  Psychiatric: As per HPI. Denies Depression. Denies Anxiety. Denies  "Suicidal/Homicidal ideations.    All Other ROS: Negative except as stated in HPI.       Objective:     /72 (BP Location: Right arm, Patient Position: Sitting, BP Method: Large (Automatic))   Pulse 86   Resp 16   Ht 5' 4" (1.626 m)   Wt 78.5 kg (173 lb)   LMP 07/25/2024 (Exact Date)   SpO2 97%   BMI 29.70 kg/m²     Physical Exam    General: Alert and oriented, No acute distress. Overweight.   Head: Normocephalic, Atraumatic.  Eye: Pupils are equal, round and reactive to light, Extraocular movements are intact, Sclera non-icteric.  Ears/Nose/Throat: Normal, Mucosa moist,Clear.  Neck/Thyroid: Supple, Non-tender, No carotid bruit, No palpable thyromegaly or thyroid nodule, No lymphadenopathy, No JVD, Full range of motion.  Respiratory: Clear to auscultation bilaterally; No wheezes, rales or rhonchi,Non-labored respirations, Symmetrical chest wall expansion.  Cardiovascular: Regular rate and rhythm, S1/S2 normal, No murmurs, rubs or gallops.  Gastrointestinal: Soft, Non-tender, Non-distended, Normal bowel sounds, No palpable organomegaly.  Musculoskeletal: Normal range of motion.  Integumentary: Warm, Dry, Intact, No suspicious lesions or rashes.  Extremities: No clubbing, cyanosis or edema  Neurologic: No focal deficits, Cranial Nerves II-XII are grossly intact, Motor strength normal upper and lower extremities, Sensory exam intact.  Psychiatric: Normal interaction, Coherent speech, Euthymic mood, Appropriate affect     *Lab results from 07/29/2024 were reviewed and discussed with patient and patient voices understanding.*      The 10-year ASCVD risk score (Dwaine BANDA, et al., 2019) is: 0.7%    Values used to calculate the score:      Age: 44 years      Sex: Female      Is Non- : Yes      Diabetic: No      Tobacco smoker: No      Systolic Blood Pressure: 118 mmHg      Is BP treated: No      HDL Cholesterol: 46 mg/dL      Total Cholesterol: 205 mg/dL     Assessment:       ICD-10-CM " ICD-9-CM   1. Wellness examination  Z00.00 V70.0   2. Benign breast cyst in female, left  N60.02 610.0   3. Mixed hyperlipidemia  E78.2 272.2   4. Vitamin D deficiency  E55.9 268.9   5. Iron deficiency anemia, unspecified iron deficiency anemia type  D50.9 280.9   6. Asymptomatic microscopic hematuria  R31.21 599.72   7. Stress at work  Z56.6 V62.1        Plan:     Problem List Items Addressed This Visit          Oncology    Iron deficiency anemia    Relevant Orders    CBC Auto Differential    Iron and TIBC     Other Visit Diagnoses       Wellness examination    -  Primary    Benign breast cyst in female, left        Relevant Orders    Ambulatory referral/consult to Breast Surgery    Mixed hyperlipidemia        Vitamin D deficiency        Relevant Medications    ergocalciferol (ERGOCALCIFEROL) 50,000 unit Cap    Asymptomatic microscopic hematuria        Relevant Orders    Urinalysis, Reflex to Urine Culture    Stress at work             1. Wellness examination  - Will treat pending lab results. Monthly breast self exam encouraged. Diet, exercise, and 10% weight loss encouraged. Keep appointment for dental exams x q6 months as scheduled. Keep appointment for annual eye exam as scheduled. Keep appointment with GYN for annual pap smear, MMG as scheduled. Keep appointment with specialists as scheduled. Notify M.D. or ER if temp greater than 100.4, or any acute illness.      2. Benign breast cyst in female, left  - Ambulatory referral/consult to Breast Surgery; Future for evaluation and treatment    3. Mixed hyperlipidemia  - FLP is elevated, but ASCVD risk score is low, no statin needed, needs low cholesterol diet, exercise, and OTC omega-3/fatty oils as directed, recheck FLP in 07/2025.     4. Vitamin D deficiency  - Rx ergocalciferol (ERGOCALCIFEROL) 50,000 unit Cap; Take 1 capsule (50,000 Units total) by mouth every 7 days.  Dispense: 12 capsule; Refill: 0; recheck Vitamin D level in 07/2025.     5. Iron deficiency  anemia, unspecified iron deficiency anemia type  - CBC Auto Differential; Future  - Iron and TIBC; Future  - Most likely due to recent menstrual cycle, needs to follow iron rich diet, recheck labs in 1-2 weeks for further recommendations.     6. Asymptomatic microscopic hematuria  - Urinalysis, Reflex to Urine Culture; Future  - Most likely due to recent menstrual cycle, recheck labs in 1-2 weeks for further recommendations.     7. Stress at work  - Patient to bring FMLA forms to MD office for intermittent leave. Continue relaxation techniques. Notify M.D. or ER if symptoms persist or worsen, SI/HI, temp greater than 100.4, or any acute illness.    Start   /  Continue   Practice deep breathing or abdominal breathing exercises when anxiety occurs.  Exercise daily. Get sunlight daily.  Avoid caffeine, alcohol and stimulants.  Practice positive phrases and repeat throughout the day, yoga, lavender scents or Chamomile tea will help anxiety.  Set healthy boundaries, avoid people and conversations that increase stress.  Reports any symptoms of suicidal or homicidal ideations immediately, if clinic is closed go to nearest emergency room.       Doreen was seen today for annual exam.    Diagnoses and all orders for this visit:    Wellness examination    Benign breast cyst in female, left  -     Ambulatory referral/consult to Breast Surgery; Future    Mixed hyperlipidemia    Vitamin D deficiency  -     ergocalciferol (ERGOCALCIFEROL) 50,000 unit Cap; Take 1 capsule (50,000 Units total) by mouth every 7 days.    Iron deficiency anemia, unspecified iron deficiency anemia type  -     CBC Auto Differential; Future  -     Iron and TIBC; Future    Asymptomatic microscopic hematuria  -     Urinalysis, Reflex to Urine Culture; Future    Stress at work          Medication List with Changes/Refills   New Medications    ERGOCALCIFEROL (ERGOCALCIFEROL) 50,000 UNIT CAP    Take 1 capsule (50,000 Units total) by mouth every 7 days.        Start Date: 7/30/2024 End Date: 10/28/2024   Current Medications    ALBUTEROL (VENTOLIN HFA) 90 MCG/ACTUATION INHALER    Inhale 2 puffs into the lungs every 6 (six) hours as needed for Wheezing. Rescue       Start Date: 7/15/2024 End Date: 7/15/2025    BUTALBITAL-ACETAMINOPHEN-CAFFEINE -40 MG (FIORICET, ESGIC) -40 MG PER TABLET    Take 1 tablet by mouth every 6 (six) hours as needed for Headaches.       Start Date: 11/13/2023End Date: --    CLINDAMYCIN-BENZOYL PEROXIDE GEL    Apply 45 g topically once daily.       Start Date: 4/13/2023 End Date: --    CYCLOBENZAPRINE (FLEXERIL) 10 MG TABLET    Take 10 mg by mouth 2 (two) times daily as needed.       Start Date: 10/20/2022End Date: --    DICLOFENAC SODIUM (VOLTAREN) 1 % GEL    Apply 2 g topically 4 (four) times daily as needed (pain/inflammation).       Start Date: 2/21/2024 End Date: --    METHIMAZOLE (TAPAZOLE) 5 MG TAB    Take 7.5 mg by mouth once daily.       Start Date: 4/14/2023 End Date: --    NYSTATIN (MYCOSTATIN) CREAM    Apply 30 g topically 2 (two) times daily.       Start Date: 4/13/2023 End Date: --    PROGESTERONE (PROMETRIUM) 200 MG CAPSULE    Take 200 mg by mouth every 14 (fourteen) days.       Start Date: 1/29/2024 End Date: --    TERBINAFINE HCL (LAMISIL) 250 MG TABLET    Take 250 mg by mouth once daily.       Start Date: 4/13/2023 End Date: --    TIZANIDINE (ZANAFLEX) 2 MG TABLET    Take 2 mg by mouth 2 (two) times daily.       Start Date: 4/24/2023 End Date: --   Discontinued Medications    NIRMATRELVIR-RITONAVIR (PAXLOVID) 300 MG (150 MG X 2)-100 MG COPACKAGED TABLETS (EUA)    Take 3 tablets by mouth 2 (two) times daily. Each dose contains 2 nirmatrelvir (pink tablets) and 1 ritonavir (white tablet). Take all 3 tablets together       Start Date: 7/15/2024 End Date: 7/30/2024          Follow up in about 1 year (around 7/30/2025) for Wellness.

## 2024-08-07 ENCOUNTER — PATIENT MESSAGE (OUTPATIENT)
Dept: FAMILY MEDICINE | Facility: CLINIC | Age: 44
End: 2024-08-07

## 2024-08-07 ENCOUNTER — OFFICE VISIT (OUTPATIENT)
Dept: FAMILY MEDICINE | Facility: CLINIC | Age: 44
End: 2024-08-07
Payer: COMMERCIAL

## 2024-08-07 VITALS
WEIGHT: 176.31 LBS | BODY MASS INDEX: 30.1 KG/M2 | SYSTOLIC BLOOD PRESSURE: 103 MMHG | HEART RATE: 68 BPM | HEIGHT: 64 IN | OXYGEN SATURATION: 99 % | DIASTOLIC BLOOD PRESSURE: 68 MMHG

## 2024-08-07 DIAGNOSIS — L50.9 URTICARIA: Primary | ICD-10-CM

## 2024-08-07 PROCEDURE — 3078F DIAST BP <80 MM HG: CPT | Mod: CPTII,,,

## 2024-08-07 PROCEDURE — 99214 OFFICE O/P EST MOD 30 MIN: CPT | Mod: 25,,,

## 2024-08-07 PROCEDURE — 1160F RVW MEDS BY RX/DR IN RCRD: CPT | Mod: CPTII,,,

## 2024-08-07 PROCEDURE — 96372 THER/PROPH/DIAG INJ SC/IM: CPT | Mod: ,,,

## 2024-08-07 PROCEDURE — 1159F MED LIST DOCD IN RCRD: CPT | Mod: CPTII,,,

## 2024-08-07 PROCEDURE — 3044F HG A1C LEVEL LT 7.0%: CPT | Mod: CPTII,,,

## 2024-08-07 PROCEDURE — 3008F BODY MASS INDEX DOCD: CPT | Mod: CPTII,,,

## 2024-08-07 PROCEDURE — 3074F SYST BP LT 130 MM HG: CPT | Mod: CPTII,,,

## 2024-08-07 RX ORDER — CETIRIZINE HYDROCHLORIDE 10 MG/1
10 TABLET ORAL DAILY
Qty: 30 TABLET | Refills: 0 | Status: SHIPPED | OUTPATIENT
Start: 2024-08-07 | End: 2024-09-06

## 2024-08-07 RX ORDER — DEXAMETHASONE SODIUM PHOSPHATE 4 MG/ML
4 INJECTION, SOLUTION INTRA-ARTICULAR; INTRALESIONAL; INTRAMUSCULAR; INTRAVENOUS; SOFT TISSUE
Status: DISCONTINUED | OUTPATIENT
Start: 2024-08-07 | End: 2024-08-07

## 2024-08-07 RX ORDER — PREDNISONE 20 MG/1
TABLET ORAL
Qty: 8 TABLET | Refills: 0 | Status: SHIPPED | OUTPATIENT
Start: 2024-08-07

## 2024-08-07 RX ORDER — DEXAMETHASONE SODIUM PHOSPHATE 10 MG/ML
4 INJECTION INTRAMUSCULAR; INTRAVENOUS ONCE
Status: COMPLETED | OUTPATIENT
Start: 2024-08-07 | End: 2024-08-07

## 2024-08-07 RX ADMIN — DEXAMETHASONE SODIUM PHOSPHATE 4 MG: 10 INJECTION INTRAMUSCULAR; INTRAVENOUS at 02:08

## 2024-08-16 ENCOUNTER — LAB VISIT (OUTPATIENT)
Dept: LAB | Facility: HOSPITAL | Age: 44
End: 2024-08-16
Attending: FAMILY MEDICINE
Payer: COMMERCIAL

## 2024-08-16 ENCOUNTER — TELEPHONE (OUTPATIENT)
Dept: FAMILY MEDICINE | Facility: CLINIC | Age: 44
End: 2024-08-16
Payer: COMMERCIAL

## 2024-08-16 DIAGNOSIS — R31.21 ASYMPTOMATIC MICROSCOPIC HEMATURIA: ICD-10-CM

## 2024-08-16 DIAGNOSIS — D50.9 IRON DEFICIENCY ANEMIA, UNSPECIFIED IRON DEFICIENCY ANEMIA TYPE: ICD-10-CM

## 2024-08-16 DIAGNOSIS — D50.9 IRON DEFICIENCY ANEMIA, UNSPECIFIED IRON DEFICIENCY ANEMIA TYPE: Primary | ICD-10-CM

## 2024-08-16 LAB
BACTERIA #/AREA URNS AUTO: ABNORMAL /HPF
BASOPHILS # BLD AUTO: 0.04 X10(3)/MCL
BASOPHILS NFR BLD AUTO: 0.5 %
BILIRUB UR QL STRIP.AUTO: NEGATIVE
CLARITY UR: CLEAR
COLOR UR AUTO: ABNORMAL
EOSINOPHIL # BLD AUTO: 0.11 X10(3)/MCL (ref 0–0.9)
EOSINOPHIL NFR BLD AUTO: 1.3 %
ERYTHROCYTE [DISTWIDTH] IN BLOOD BY AUTOMATED COUNT: 15.8 % (ref 11.5–17)
GLUCOSE UR QL STRIP: NORMAL
HCT VFR BLD AUTO: 39.9 % (ref 37–47)
HGB BLD-MCNC: 12.2 G/DL (ref 12–16)
HGB UR QL STRIP: ABNORMAL
IMM GRANULOCYTES # BLD AUTO: 0.18 X10(3)/MCL (ref 0–0.04)
IMM GRANULOCYTES NFR BLD AUTO: 2.2 %
IRON SATN MFR SERPL: 15 % (ref 20–50)
IRON SERPL-MCNC: 46 UG/DL (ref 50–170)
KETONES UR QL STRIP: NEGATIVE
LEUKOCYTE ESTERASE UR QL STRIP: NEGATIVE
LYMPHOCYTES # BLD AUTO: 1.95 X10(3)/MCL (ref 0.6–4.6)
LYMPHOCYTES NFR BLD AUTO: 23.7 %
MCH RBC QN AUTO: 26.8 PG (ref 27–31)
MCHC RBC AUTO-ENTMCNC: 30.6 G/DL (ref 33–36)
MCV RBC AUTO: 87.7 FL (ref 80–94)
MONOCYTES # BLD AUTO: 0.58 X10(3)/MCL (ref 0.1–1.3)
MONOCYTES NFR BLD AUTO: 7 %
MUCOUS THREADS URNS QL MICRO: ABNORMAL /LPF
NEUTROPHILS # BLD AUTO: 5.37 X10(3)/MCL (ref 2.1–9.2)
NEUTROPHILS NFR BLD AUTO: 65.3 %
NITRITE UR QL STRIP: NEGATIVE
NRBC BLD AUTO-RTO: 0 %
PH UR STRIP: 6 [PH]
PLATELET # BLD AUTO: 129 X10(3)/MCL (ref 130–400)
PLATELETS.RETICULATED NFR BLD AUTO: 18.7 % (ref 0.9–11.2)
PMV BLD AUTO: ABNORMAL FL
PROT UR QL STRIP: NEGATIVE
RBC # BLD AUTO: 4.55 X10(6)/MCL (ref 4.2–5.4)
RBC #/AREA URNS AUTO: ABNORMAL /HPF
SP GR UR STRIP.AUTO: 1.02 (ref 1–1.03)
SQUAMOUS #/AREA URNS LPF: ABNORMAL /HPF
TIBC SERPL-MCNC: 253 UG/DL (ref 70–310)
TIBC SERPL-MCNC: 299 UG/DL (ref 250–450)
TRANSFERRIN SERPL-MCNC: 280 MG/DL (ref 180–382)
UROBILINOGEN UR STRIP-ACNC: NORMAL
WBC # BLD AUTO: 8.23 X10(3)/MCL (ref 4.5–11.5)
WBC #/AREA URNS AUTO: ABNORMAL /HPF

## 2024-08-16 PROCEDURE — 36415 COLL VENOUS BLD VENIPUNCTURE: CPT

## 2024-08-16 PROCEDURE — 81001 URINALYSIS AUTO W/SCOPE: CPT

## 2024-08-16 PROCEDURE — 83550 IRON BINDING TEST: CPT

## 2024-08-16 PROCEDURE — 85025 COMPLETE CBC W/AUTO DIFF WBC: CPT

## 2024-08-16 PROCEDURE — 83540 ASSAY OF IRON: CPT

## 2024-08-16 RX ORDER — FERROUS GLUCONATE 324(38)MG
324 TABLET ORAL 2 TIMES DAILY WITH MEALS
Qty: 60 TABLET | Refills: 1 | Status: SHIPPED | OUTPATIENT
Start: 2024-08-16 | End: 2024-10-15

## 2024-08-16 NOTE — TELEPHONE ENCOUNTER
----- Message from Thelma Dougherty MD sent at 8/16/2024 11:37 AM CDT -----  She still has microscopic blood in her urine, order for CT abdomen/pelvis without contrast is in file for evaluation for possible kidney stones. Anemia is improved, but iron level is still low, 46, normal: , needs to follow iron rich diet and Rx iron to take bid sent, recheck CBC and iron level in 4-6 weeks. Remaining labs are essentially normal.

## 2024-08-22 DIAGNOSIS — E27.8 OTHER SPECIFIED DISORDERS OF ADRENAL GLAND: ICD-10-CM

## 2024-08-22 DIAGNOSIS — Q89.09 ACCESSORY SPLEEN: ICD-10-CM

## 2024-08-22 DIAGNOSIS — K42.9 UMBILICAL HERNIA WITHOUT OBSTRUCTION AND WITHOUT GANGRENE: ICD-10-CM

## 2024-08-22 DIAGNOSIS — R19.09 ABDOMINAL MASS OF OTHER SITE: Primary | ICD-10-CM

## 2024-08-22 PROBLEM — R19.00 ABDOMINAL LUMP: Status: ACTIVE | Noted: 2024-08-22

## 2024-08-22 NOTE — PROGRESS NOTES
[facility] Breast Surg  Breast Surgical Oncology  New Patient Office Visit - H&P      Referring Provider: Dr. Thelma Dougherty  PCP: Thelma Dougherty MD   Care Team:    Chief Complaint:   Chief Complaint   Patient presents with    Breast Mass     Patient reports left breast cyst that has dissolved, no redness, no swelling, no nipple discharge       Subjective:     HPI:  Doreen Dorman is a 44 y.o. female who presents on 8/27/2024 for evaluation of left breast masses, which were initially found on screening mammogram in May 2023 and were recommended for close monitoring with diagnostic imaging. She returned for mammogram/US in June 2023 now with a palpable concern in the left and one of the masses was noted to have increased in size. This was biopsied, and results showed benign fat necrosis. Close interval follow up imaging of this biopsied mass as well as other areas of concern have since shown stability for 1 year. She is currently recommended for a follow up mammogram scheduled in May 2024 to complete 2 years of follow up since the masses were first found.      Since the biopsy in June 2023, she has remained asymptomatic and feels that the fat necrosis lump has gradually shrunk in size. However, about 1 month ago, she felt a soft lump in the 10:00 axis of the left breast about 4 cmfn. She noticed it around the time of menstruation and it has since disappeared. She has not felt any lumps since this visit. She currently denies any breast issues including rashes, redness, pain, swelling, nipple discharge, or new lumps/masses. She does have a history of fibrocystic breasts and occasionally has breast pains. She has regular menstrual cycles s/t progesterone supplementation to induce ovulation, which she takes as an alternative to birth control.    She does report a family history of breast cancer including diagnosis in her half sister. Her lifetime risk was assessed  and found to be elevated at 33.4%.    Imagin2023 BL DG MG and BL Breast US Complete - BIRADS 3:   1. Oval, mixed echogenicity mass with indistinct margins seen sonographically in the 11:00 left breast, 6 cm from the nipple, without a mammographic correlate is probably benign.  This is favored to represent fat necrosis in the setting of trauma in this region.  A follow-up targeted left breast ultrasound and possible left diagnostic mammogram in 3 months is recommended.  2. Oval, anechoic mass with circumscribed margins measuring up to 22 mm in the 10:00 left breast, 3 cm from the nipple, is probably benign.  This is favored to represent a cyst cluster.  Attention at time of follow-up is recommended.  3. Oval, hypoechoic mass with circumscribed margins measuring up to 9 mm in the 4:00 left breast, 4 cm from the nipple, is probably benign.  It is favored to represent a complicated cyst versus a fibroadenoma.  Attention at time of follow-up is recommended.  2023 L DG MG and L breast US - BIRADS 4:   1. Oval, mixed echogenicity mass with indistinct margins measuring up to 12 mm in the 11:00 left breast, 6 cm from the nipple, is now considered suspicious given that it is now palpable and given the interval increase in size.  Left breast ultrasound-guided core needle biopsy is recommended. (Results below - benign and a follow-up left diagnostic mammogram and targeted left breast ultrasound is recommended in 6 months to demonstrate stability of the additional masses assessed as probably benign)  2. Oval, anechoic mass with circumscribed margins measuring up to 20 mm in the 10:00 left breast, 3 cm from the nipple, is not significantly changed compared to the 2023 examination and thus remains probably benign.  Final recommendations regarding this mass will be made once the above biopsy results become available.  3. Oval, hypoechoic mass with circumscribed margins measuring up to 9 mm in the 4:00 left  breast, 4 cm from the nipple, is not significantly changed compared to the 05/23/2023 examination and thus remains probably benign.  Final recommendations regarding this mass will be made once the above biopsy results become available.    1/17/2024 L DG MG and L Breast US complete - BIRADS 3:  1.  Left breast 10:00 3 cm from the nipple posterior depth grouping of avascular anechoic masses with circumscribed margins, together spanning approximately 2.6 cm, is not significantly changed compared to previous exams dating back to 5/23/2023 (8 months ago) and remains probably benign.  This is again favored to represent a cyst cluster.  2.  Left breast 4:00 4 cm from the nipple 1.0 cm avascular mixed echogenicity mass with circumscribed margins is not significantly changed compared to previous exams dating back to 5/23/2023 (8 months ago) and remains probably benign.  This is again most likely a complicated cyst versus a fibroadenoma.  3.  Multiple additional benign cysts of varying size and complication in the left breast, several of which are new or increased in size compared to previous exams.  The largest of these benign cysts in the 3:00 left breast 4 cm from the nipple measures 1.7 cm.  4.  Prior benign core needle biopsy of the 11:00 left breast 6 cm from the nipple on 7/17/2023 (organizing fat necrosis), with no detrimental interval change at the biopsy site.  RECOMMENDATIONS: Bilateral diagnostic mammography with tomosynthesis, followed by targeted left breast ultrasound, will be due in 4 months as part of the two year follow-up process for probably benign findings (May 2024).    5/21/2024 BL DG MG and L breast US - BIRADS 3:   1) Left breast 10:00 axis 3 cm FN 1.7 x 0.7 x 2.7 cm grouping of oval parallel hypoechoic circumscribed avascular masses is not significantly changed compared to prior mammograms and ultrasounds dating back to 05/23/2023 (1 year ago) and remains probably benign.  BI-RADS 3: Probably benign.     2) Left breast 4:00 axis 4 cm FN 0.8 x 0.5 x 0.9 cm oval parallel hypoechoic/anechoic circumscribed avascular mass is stable compared to prior ultrasounds dating back to 2023 and remains probably benign.  Favor this to represent a complicated cyst.  BI-RADS 3: Probably benign.    3) No mammographic evidence of malignancy in the right breast.  4) Cystic mastopathy of the left breast is benign.  BI-RADS 2: Benign.  RECOMMENDATIONS: Recommend continued follow-up of the left breast 10:00 axis 3 cm FN and left breast 4:00 axis 4 cm FN probably benign findings according to the standard BI-RADS 3 protocol.  Next exam due is bilateral diagnostic mammogram and limited left breast ultrasound in May, 2025.  That exam will represent 2 years of follow-up from the time of initial probably benign assessment.    Pathology:   2023 LEFT BREAST 11 O'CLOCK 6 CMFN AREA OF PALP:   ORGANIZING FAT NECROSIS. NO EVIDENCE OF MALIGNANCY.     OB/GYN History:  Age at Menarche Onset: 12  Menopausal Status: premenopausal, LMP: Patient's last menstrual period was 2024.  Hysterectomy/Oophorectomy: no, neither  Hormonal birth control (duration): none  Pregnancy History:   Age at first live birth: n/a  Hormone Replacement Therapy: No, none    Other:  MG breast density: BIRADS C  Prior thoracic RT: none  Genetic testing: none  Ashkenazi Hoahaoism descent: No    Family History:  Family History   Problem Relation Name Age of Onset    Arthritis Mother Charline Dorman     No Known Problems Father      Breast cancer Other Merline Salo 45        great aunt    Breast cancer Half-sister Rachelle Koch         paternal        Patient History:  Past Medical History:   Diagnosis Date    Anemia     Anxiety     Hyperthyroidism        Active Problem List with Overview Notes    Diagnosis Date Noted    Abdominal lump 2024    Accessory spleen 2024    Umbilical hernia without obstruction and without gangrene 2024    Urticaria  08/07/2024    Hyperthyroidism     Overweight (BMI 25.0-29.9) 04/17/2024    Hordeolum internum of right upper eyelid 04/17/2024    Iron deficiency anemia 05/12/2023    Low platelet count 05/12/2023        Past Surgical History:   Procedure Laterality Date    CARDIAC VALVE REPLACEMENT      CERVICAL SPINE SURGERY N/A 08/11/2022    FRACTURE SURGERY  8/11/2022    NASAL SEPTOPLASTY      SPINE SURGERY  8/11/2022       Social History     Socioeconomic History    Marital status: Single   Tobacco Use    Smoking status: Never     Passive exposure: Never    Smokeless tobacco: Never   Substance and Sexual Activity    Alcohol use: Never    Drug use: Never    Sexual activity: Yes     Partners: Male     Birth control/protection: None     Social Determinants of Health     Financial Resource Strain: Medium Risk (2/21/2024)    Overall Financial Resource Strain (CARDIA)     Difficulty of Paying Living Expenses: Somewhat hard   Food Insecurity: Food Insecurity Present (2/21/2024)    Hunger Vital Sign     Worried About Running Out of Food in the Last Year: Sometimes true     Ran Out of Food in the Last Year: Sometimes true   Transportation Needs: No Transportation Needs (2/21/2024)    PRAPARE - Transportation     Lack of Transportation (Medical): No     Lack of Transportation (Non-Medical): No   Physical Activity: Sufficiently Active (2/21/2024)    Exercise Vital Sign     Days of Exercise per Week: 4 days     Minutes of Exercise per Session: 60 min   Stress: Stress Concern Present (2/21/2024)    [employer] - Occupational Stress Questionnaire     Feeling of Stress : Very much   Housing Stability: Low Risk  (2/21/2024)    Housing Stability Vital Sign     Unable to Pay for Housing in the Last Year: No     Number of Places Lived in the Last Year: 1     Unstable Housing in the Last Year: No       Immunization History   Administered Date(s) Administered    COVID-19 MRNA, LN-S PF (MODERNA HALF 0.25 ML DOSE)  12/23/2021    COVID-19, MRNA, LN-S, PF (MODERNA FULL 0.5 ML DOSE) 03/05/2021, 04/05/2021    DTaP 1982, 1982, 02/25/1983, 11/08/1984, 08/27/1987    MMR 11/08/1984    OPV 1982, 1982, 02/25/1983, 11/08/1984, 08/27/1987    PPD Test 04/06/2000    Tdap 05/14/2021       Medications/Allergies:    Current Outpatient Medications:     albuterol (VENTOLIN HFA) 90 mcg/actuation inhaler, Inhale 2 puffs into the lungs every 6 (six) hours as needed for Wheezing. Rescue, Disp: 18 g, Rfl: 0    butalbital-acetaminophen-caffeine -40 mg (FIORICET, ESGIC) -40 mg per tablet, Take 1 tablet by mouth every 6 (six) hours as needed for Headaches., Disp: 30 tablet, Rfl: 2    cetirizine (ZYRTEC) 10 MG tablet, Take 1 tablet (10 mg total) by mouth once daily., Disp: 30 tablet, Rfl: 0    clindamycin-benzoyl peroxide gel, Apply 45 g topically once daily., Disp: , Rfl:     cyclobenzaprine (FLEXERIL) 10 MG tablet, Take 10 mg by mouth 2 (two) times daily as needed., Disp: , Rfl:     diclofenac (VOLTAREN) 75 MG EC tablet, Take 75 mg by mouth 2 (two) times daily., Disp: , Rfl:     ergocalciferol (ERGOCALCIFEROL) 50,000 unit Cap, Take 1 capsule (50,000 Units total) by mouth every 7 days., Disp: 12 capsule, Rfl: 0    ferrous gluconate (FERGON) 324 MG tablet, Take 1 tablet (324 mg total) by mouth 2 (two) times daily with meals., Disp: 60 tablet, Rfl: 1    fluticasone propionate (FLONASE) 50 mcg/actuation nasal spray, SPRAY TWICE IN EACH NOSTRIL ONCE DAILY, Disp: , Rfl:     methIMAzole (TAPAZOLE) 5 MG Tab, Take 7.5 mg by mouth once daily., Disp: , Rfl:     nystatin (MYCOSTATIN) cream, Apply 30 g topically 2 (two) times daily., Disp: , Rfl:     ondansetron (ZOFRAN-ODT) 4 MG TbDL, Take 4 mg by mouth., Disp: , Rfl:     progesterone (PROMETRIUM) 200 MG capsule, Take 200 mg by mouth every 14 (fourteen) days., Disp: , Rfl:     terbinafine HCL (LAMISIL) 250 mg tablet, Take 250 mg by mouth once daily., Disp: , Rfl:     tiZANidine  "(ZANAFLEX) 2 MG tablet, Take 2 mg by mouth 2 (two) times daily., Disp: , Rfl:      Review of patient's allergies indicates:  No Known Allergies    Review of Systems:  ROS     Objective:     Vitals:  Vitals:    08/27/24 0912   BP: 106/72   BP Location: Right arm   Patient Position: Sitting   BP Method: Large (Automatic)   Pulse: 75   Resp: 18   Temp: 98.1 °F (36.7 °C)   TempSrc: Oral   SpO2: 97%   Weight: 79.8 kg (176 lb)   Height: 5' 4" (1.626 m)       Body mass index is 30.21 kg/m².     Physical Exam:  General: The patient is awake, alert and oriented times three. The patient is well nourished and in no acute distress.  Neck: There is no evidence of palpable cervical, supraclavicular or axillary adenopathy. The neck is supple. The thyroid is not enlarged.  Musculoskeletal: The patient has a normal range of motion of her bilateral upper extremities.  Chest: Examination of the chest wall fails to reveal any obvious abnormalities.  The lungs are clear to auscultation bilaterally without rales, rhonchi, or wheezing.  Cardiovascular: The heart has a regular rate and rhythm without murmurs, gallops or rubs.  Breast:   Right:  Examination of right breast fails to reveal any dominant masses or areas of significant focal nodularity. The nipple is everted without evidence of discharge. There is no skin dimpling with movement of the pectoralis. There is no significant skin changes overlying the breast.   Left:  Examination of the left breast fails to reveal any dominant masses or areas of significant focal nodularity. Particularly, no palpable masses or area of focal nodularity in the UIQ where palpable concern was previously present. The nipple is everted without evidence of discharge. There is no skin dimpling with movement of the pectoralis. There are no significant skin changes overlying the breast.  Abdomen: The abdomen is soft, flat, nontender and nondistended with no palpable masses or organomegaly.  Integumentary: no " rashes or skin lesions present  Neurologic: cranial nerves intact, no signs of peripheral neurological deficit, motor/sensory function intact    Assessment:     Patient Active Problem List   Diagnosis    Iron deficiency anemia    Low platelet count    Overweight (BMI 25.0-29.9)    Hordeolum internum of right upper eyelid    Urticaria    Hyperthyroidism    Abdominal lump    Accessory spleen    Umbilical hernia without obstruction and without gangrene        Crystal was seen today for breast mass.    Diagnoses and all orders for this visit:    Benign breast cyst in female, left  -     Ambulatory referral/consult to Breast Surgery    At high risk for breast cancer  -     MRI Screening Breast W/WO Contrast, W/CAD, Tl; Future    Fibrocystic breast changes, unspecified laterality    Fat necrosis of left breast       Reviewed prior imaging, pathology, and symptoms thoroughly with patient. I discussed findings on today's exam which reveal no palpable lumps. She also reports lumps have resolved. I educated patient and provided education forms regarding fat necrosis and fibrocystic breast changes. Also discussed elevated lifetime risk and management.     Plan:        Recommend continue imaging surveillance of the BIRADS 3 findings in the left breast, due in May 2025 to complete 2 years of follow up.    Also recommend supplemental high risk screening with breast MRIs. This is tentatively due 6 months from bilateral imaging (Nov 2024), but schedule is currently full and booked out until February. Will schedule patient for next available appointment.    Discussed fluctuation of lump in the 10:00 axis likely represents fibrocystic breast changes, especially given cysts seen in this area on US/MG. Recommend clinical observation of these symptoms which are now resolved and not present on today's exam. Should she develop any new lumps, should lump return and persist, or if she develops any new symptoms, advise she call for sooner  re-evaluation.    RTC after MRI for CBE.    Healthy lifestyle guidelines were reviewed. She was encouraged to engage in regular exercise, maintain a healthy body weight, and avoid excessive alcohol consumption. Healthy nutritional guidelines were also discussed. Self-breast examination was reviewed with the patient in detail and she was encouraged to perform this on a monthly basis.     CC: Dr. Thelma Dougherty    All of her questions were answered. She was advised to call if she develops any questions or concerns.    Sharon Ybarra PA-C         --------------------------------------------------------------------------------------------------------------  Total time on the date of the visit ranged from 60-74 mins ([id number]). Total time includes both face-to-face and non-face-to-face time personally spent by myself on the day of the visit.    Non-face-to-face time included:  _X_ preparing to see the patient such as reviewing the patient record  _X_ obtaining and reviewing separately obtained history  _X_ independently interpreting results  _X_ documenting clinical information in electronic health record.    Face-to-face time included:  _X_ performing an appropriate history and examination  _X_ communicating results to the patient  _X_ counseling and educating the patient  __ ordering appropriate medications  _x_ ordering appropriate tests  _X_ ordering appropriate procedures (including follow-up)  _X_ answering any questions the patient had    Total Time spent on date of visit: 60 minutes

## 2024-08-23 ENCOUNTER — TELEPHONE (OUTPATIENT)
Dept: FAMILY MEDICINE | Facility: CLINIC | Age: 44
End: 2024-08-23
Payer: COMMERCIAL

## 2024-08-23 NOTE — TELEPHONE ENCOUNTER
----- Message from Thelma Dougherty MD sent at 8/22/2024  3:47 PM CDT -----  CT abdomen/pelvis shows rounded densities near the left adrenal gland, the largest measuring 3 cm, incompletely evaluated, but possible splenules. An accessory spleen, also called a supernumerary spleen, a splenule, or a splenunculus, is a benign and asymptomatic condition in which splenic tissue is found outside the normal spleen. Accessory spleens are a relatively common phenomenon with an estimated 10% to 30% of the population having one. They are usually incidental findings with no clinical significance. I ordered a CT abdomen with contrast (dye) for further evaluation and treatment recommendations.   - Her uterus is mildly enlarged and there is the presence of a menstrual cup, please forward result to her GYN (Dr. Ross) for review.   - There is no evidence of kidney or bladder stones.   - She has a small fat containing umbilical hernia. In adults, umbilical hernias happen when pressure in your abdomen causes weaknesses in your abdominal wall muscles. Parts of your small intestine and related tissue can bulge through the weakened muscle. In adults especially, reducible hernias can become incarcerated. The small intestine can get stuck in the hernia, causing problems like a small bowel obstruction (a blockage in your intestine). It can also progress to strangulation, which causes tissue to die.The only treatment for an umbilical hernia is hernia repair surgery. During the procedure, a surgeon makes incisions (cuts) that allow them to access the hernia and push the tissue back into place. Then, they strengthen the abdominal wall muscle to hold the tissue in place. Sometimes, they use a material called surgical mesh to strengthen the tissue barrier.Your hernia is small at this time, we can either monitor it, or we can proceed with a General Surgery referral to discuss surgical repair, please advise with patient preference.   -  Remaining CT abdomen/pelvis is normal.

## 2024-08-26 ENCOUNTER — DOCUMENTATION ONLY (OUTPATIENT)
Dept: FAMILY MEDICINE | Facility: CLINIC | Age: 44
End: 2024-08-26
Payer: COMMERCIAL

## 2024-08-26 LAB
CHOLESTEROL (LIPID PANEL): 189
HDLC SERPL-MCNC: 56 MG/DL
LDLC SERPL CALC-MCNC: 109 MG/DL
TRIGL SERPL-MCNC: 120 MG/DL

## 2024-08-27 ENCOUNTER — OFFICE VISIT (OUTPATIENT)
Dept: SURGERY | Facility: CLINIC | Age: 44
End: 2024-08-27
Payer: COMMERCIAL

## 2024-08-27 ENCOUNTER — PATIENT MESSAGE (OUTPATIENT)
Dept: FAMILY MEDICINE | Facility: CLINIC | Age: 44
End: 2024-08-27
Payer: COMMERCIAL

## 2024-08-27 VITALS
BODY MASS INDEX: 30.05 KG/M2 | OXYGEN SATURATION: 97 % | RESPIRATION RATE: 18 BRPM | HEIGHT: 64 IN | SYSTOLIC BLOOD PRESSURE: 106 MMHG | DIASTOLIC BLOOD PRESSURE: 72 MMHG | HEART RATE: 75 BPM | WEIGHT: 176 LBS | TEMPERATURE: 98 F

## 2024-08-27 DIAGNOSIS — N60.19 FIBROCYSTIC BREAST CHANGES, UNSPECIFIED LATERALITY: ICD-10-CM

## 2024-08-27 DIAGNOSIS — Z91.89 AT HIGH RISK FOR BREAST CANCER: ICD-10-CM

## 2024-08-27 DIAGNOSIS — N64.1 FAT NECROSIS OF LEFT BREAST: ICD-10-CM

## 2024-08-27 DIAGNOSIS — N60.02 BENIGN BREAST CYST IN FEMALE, LEFT: Primary | ICD-10-CM

## 2024-08-27 PROCEDURE — 3044F HG A1C LEVEL LT 7.0%: CPT | Mod: CPTII,S$GLB,, | Performed by: PHYSICIAN ASSISTANT

## 2024-08-27 PROCEDURE — 3008F BODY MASS INDEX DOCD: CPT | Mod: CPTII,S$GLB,, | Performed by: PHYSICIAN ASSISTANT

## 2024-08-27 PROCEDURE — 3074F SYST BP LT 130 MM HG: CPT | Mod: CPTII,S$GLB,, | Performed by: PHYSICIAN ASSISTANT

## 2024-08-27 PROCEDURE — 99205 OFFICE O/P NEW HI 60 MIN: CPT | Mod: S$GLB,,, | Performed by: PHYSICIAN ASSISTANT

## 2024-08-27 PROCEDURE — 1160F RVW MEDS BY RX/DR IN RCRD: CPT | Mod: CPTII,S$GLB,, | Performed by: PHYSICIAN ASSISTANT

## 2024-08-27 PROCEDURE — 3078F DIAST BP <80 MM HG: CPT | Mod: CPTII,S$GLB,, | Performed by: PHYSICIAN ASSISTANT

## 2024-08-27 PROCEDURE — 1159F MED LIST DOCD IN RCRD: CPT | Mod: CPTII,S$GLB,, | Performed by: PHYSICIAN ASSISTANT

## 2024-08-27 PROCEDURE — 99999 PR PBB SHADOW E&M-EST. PATIENT-LVL V: CPT | Mod: PBBFAC,,, | Performed by: PHYSICIAN ASSISTANT

## 2024-08-27 RX ORDER — DICLOFENAC SODIUM 75 MG/1
75 TABLET, DELAYED RELEASE ORAL 2 TIMES DAILY
COMMUNITY
Start: 2024-07-16

## 2024-08-27 RX ORDER — ONDANSETRON 4 MG/1
4 TABLET, ORALLY DISINTEGRATING ORAL
COMMUNITY
Start: 2024-07-10

## 2024-08-27 RX ORDER — FLUTICASONE PROPIONATE 50 MCG
SPRAY, SUSPENSION (ML) NASAL
COMMUNITY
Start: 2024-08-13

## 2024-08-27 NOTE — PATIENT INSTRUCTIONS
The following information is adapted from [facility]now[facility]org.    What is fat necrosis?   Fat necrosis is a benign (not cancer) condition that most commonly develops after an injury or trauma to the breast tissue.   A lump can form if an area of fatty breast tissue is damaged, for example during a biopsy or surgery.   Necrosis is a medical term that describes damaged or dead tissue.   It can occur anywhere in the breast and can affect women of any age.   Men can also get fat necrosis, but this is very rare.   Having fat necrosis does not increase your risk of developing breast cancer.     What causes fat necrosis?   Fat necrosis can be caused by any type of trauma to the breast including:    Breast biopsy    Breast surgery including lumpectomy, breast reduction, reconstruction or enlargement    Radiotherapy to the breast    Lipomodelling: when fat taken from another part of the body is injected into the breast, for example to improve the appearance of dents following surgery    Damage or injury caused by an accident such as a seat belt injury or fall     Fat necrosis can be found at any time after breast surgery or an injury. Sometimes it develops without any trauma and many women with fat necrosis do not remember a specific injury.     What are the symptoms?   Fat necrosis feels like a firm, round lump or lumps.   Its usually painless, but in some people it may feel tender or even painful.   The skin around the lump may look thickened, red, bruised or occasionally dimpled.   Sometimes fat necrosis can cause the nipple to be pulled in.     How is fat necrosis diagnosed?   Fat necrosis usually becomes noticeable as a lump in the breast.   Sometimes its found by chance after a mammogram (breast x-ray) during breast screening.   If you see your GP because of a breast lump, theyll examine your breasts and will likely refer you to a breast clinic where youll be seen by specialist doctors or nurses.     At the breast  clinic most people have a breast examination followed by one or more of the following tests:    A mammogram    An ultrasound scan (using sound waves to produce an image)    A core biopsy (using a hollow needle to take a small sample of tissue to be looked at under a microscope - several tissue samples may be taken at the same time)    A fine needle aspiration (using a fine needle and syringe to take a sample of cells to be looked at under a microscope)     During a breast examination fat necrosis can be confused with breast cancer. It may also look like breast cancer on x-rays and scans, which can cause a lot of anxiety. If the mammogram or ultrasound scan clearly shows fat necrosis, you may not need more tests. If theres any doubt about what the mammogram or ultrasound scan shows, you will have a biopsy.   Your breast clinic appointment for more information about any tests you may be having.     How is fat necrosis treated?   Most people do not need treatment   Fat necrosis is harmless so you will not usually need any treatment or follow-up.   In most cases the body will break it down over time. This could take a few months.   Its important to go back to your GP or breast specialist if the lump gets bigger or you notice any other changes to your breasts.     Surgery is usually avoided if possible because it can sometimes cause further fat necrosis.   However, an operation to remove the fat necrosis may be recommended if:    The biopsy has not given enough information to confirm a diagnosis of fat necrosis    The fat necrosis is uncomfortable or tender    The lump or lumpy area does not go away by itself or gets bigger     If fat necrosis causes discomfort but youre not having an operation to remove it, your doctor may suggest treating it with pain relief such as paracetamol or an anti-inflammatory drug like ibuprofen.   Sometimes, within an area of fat necrosis the damaged tissue can form a cyst containing an  oily fluid (oil cyst). Breast cysts dont usually need any treatment or follow-up. Most cysts go away by themselves and are nothing to worry about.   If the cyst is large or causing discomfort, your specialist may draw off the fluid using a fine needle and syringe.    Can fat necrosis increase the risk of breast cancer?   Having fat necrosis does not increase your risk of developing breast cancer.   Some people worry the fat necrosis might turn into breast cancer, but theres no evidence that this can happen.   However, its still important to be breast aware and go back to your GP if you notice any changes in your breasts, regardless of how soon these occur after your diagnosis of fat necrosis.     ----------------------------------------------------------------------------------------------------------------------------    Fibrocystic breast change is a common noncancerous condition that affects mostly premenopausal women. Fibrocystic breast changes encompass a wide variety of symptoms, including breast tenderness or discomfort, the sudden appearance or disappearance of palpable benign masses in the breast, or lumpy, free-moving masses in the breast. Fibrocystic breast changes are most common in women ages 20 to 50. It is extremely rare in women past menopause not receiving hormonal replacement. It affects an estimated 50% of women between the ages of 20 and 50. Fibrocystic changes are the most frequent lesion of the breast.     Drinking alcohol may increase the risk of fibrocystic breast changes, especially in young women between 18 and 22 years of age. Caffeine is also thought to contribute to the severity of fibrocystic breast changes and pain. The exact mechanism is unclear, fibrocystic breast changes are believed to be caused by fluctuating levels of hormones, especially estrogen, during the menstrual cycle.     Fibrocystic breast changes encompass a wide variety of symptoms including:   - Breast tenderness  or discomfort, which can worsen during the immediate premenstrual period.   - The sudden appearance or disappearance of palpable benign masses in the breast.   - Lumpy, free-moving masses in the breast, often near the armpit.     In the majority of cases, no treatment is needed. Those suffering pain or discomfort due to fibrocystic breast changes are advised to wear a good, supportive bra both day and night when symptoms are worse. It is also advised to avoid contact sports and other activities which could cause injury to the breasts. It is also advised to avoid caffeine consumption: coffee, tea, soda, and chocolate can all contribute to the pain.     Sometimes, the cyst can be completely aspirated through the needle during the biopsy, but future masses can occur in patients with fibrocystic breast changes.

## 2024-09-02 ENCOUNTER — PATIENT MESSAGE (OUTPATIENT)
Dept: FAMILY MEDICINE | Facility: CLINIC | Age: 44
End: 2024-09-02
Payer: COMMERCIAL

## 2024-09-02 DIAGNOSIS — L50.9 URTICARIA: ICD-10-CM

## 2024-09-02 DIAGNOSIS — E55.9 VITAMIN D DEFICIENCY: ICD-10-CM

## 2024-09-03 RX ORDER — CETIRIZINE HYDROCHLORIDE 10 MG/1
10 TABLET ORAL DAILY
Qty: 30 TABLET | Refills: 0 | Status: SHIPPED | OUTPATIENT
Start: 2024-09-03 | End: 2024-10-03

## 2024-09-03 RX ORDER — ERGOCALCIFEROL 1.25 MG/1
50000 CAPSULE ORAL
Qty: 12 CAPSULE | Refills: 0 | Status: SHIPPED | OUTPATIENT
Start: 2024-09-03 | End: 2024-12-02

## 2024-09-19 ENCOUNTER — TELEPHONE (OUTPATIENT)
Dept: SURGERY | Facility: CLINIC | Age: 44
End: 2024-09-19
Payer: COMMERCIAL

## 2024-09-19 NOTE — TELEPHONE ENCOUNTER
"Patient called with information below.  She verbalized understanding.        ----- Message from Tu Lomeli RN sent at 9/19/2024 10:06 AM CDT -----  Regarding: FW: Lump Returned    ----- Message -----  From: Sharon Ybarra PA  Sent: 9/19/2024   9:49 AM CDT  To: Tu Lomeli RN; Corinne Metz  Subject: RE: Lump Returned                                If it is already smaller and formed around the time of her cycle, its consistent with a cyst. Tell her that I would recommend just observing as before. I would only be concerned about a lump if it is persisting for more than 2 weeks. Cysts come and go like this.  Thanks!  ----- Message -----  From: Tu Lomeli RN  Sent: 9/19/2024   9:24 AM CDT  To: CORBY Magallanes; Corinne Metz  Subject: RE: Lump Returned                                I spoke with patient. She states that she noticed the lump in her left breast per palpation on Monday 9/16/24. She states that her cycle started on this day as well.  She states that the lump is "somewhat hard", but has gotten smaller since Monday. She denies any redness, swelling, or warmth to the area.  ----- Message -----  From: Sharon Ybarra PA  Sent: 9/18/2024   4:42 PM CDT  To: Tu Lomeli RN; Corinne Metz  Subject: RE: Lump Returned                                Tu Pickens can we call patient tomorrow to see how long the lump has been present, what it feels like, etc. I think the next step since she is having some persistent problems would be to order a L DG MG and US but would just like to obtain more information first to put into a telephone note.  Thanks!  ----- Message -----  From: Corinne Metz  Sent: 9/18/2024   3:47 PM CDT  To: CORBY Magallanes; Tu Lomeli RN  Subject: Lump Returned                                    Patient was seen last month by Sharon and was told if she had any problems to return. Patient c/o lump visible on left breast again. Please call patient.  "

## 2024-10-18 ENCOUNTER — PATIENT MESSAGE (OUTPATIENT)
Dept: FAMILY MEDICINE | Facility: CLINIC | Age: 44
End: 2024-10-18
Payer: COMMERCIAL

## 2024-10-18 DIAGNOSIS — L50.9 URTICARIA: ICD-10-CM

## 2024-10-18 DIAGNOSIS — Z56.6 STRESS AT WORK: Primary | ICD-10-CM

## 2024-10-18 SDOH — SOCIAL DETERMINANTS OF HEALTH (SDOH): OTHER PHYSICAL AND MENTAL STRAIN RELATED TO WORK: Z56.6

## 2024-10-21 RX ORDER — CETIRIZINE HYDROCHLORIDE 10 MG/1
10 TABLET ORAL DAILY
Qty: 90 TABLET | Refills: 3 | Status: SHIPPED | OUTPATIENT
Start: 2024-10-21 | End: 2025-10-21

## 2024-11-21 ENCOUNTER — APPOINTMENT (OUTPATIENT)
Dept: RADIOLOGY | Facility: HOSPITAL | Age: 44
End: 2024-11-21
Attending: PHYSICIAN ASSISTANT
Payer: COMMERCIAL

## 2024-11-21 DIAGNOSIS — Z91.89 AT HIGH RISK FOR BREAST CANCER: ICD-10-CM

## 2024-11-21 PROCEDURE — 77049 MRI BREAST C-+ W/CAD BI: CPT | Mod: TC

## 2024-11-21 PROCEDURE — A9577 INJ MULTIHANCE: HCPCS | Performed by: PHYSICIAN ASSISTANT

## 2024-11-21 PROCEDURE — 77049 MRI BREAST C-+ W/CAD BI: CPT | Mod: 26,,, | Performed by: RADIOLOGY

## 2024-11-21 PROCEDURE — 25500020 PHARM REV CODE 255: Performed by: PHYSICIAN ASSISTANT

## 2024-11-21 RX ADMIN — GADOBENATE DIMEGLUMINE 15 ML: 529 INJECTION, SOLUTION INTRAVENOUS at 02:11

## 2024-11-23 DIAGNOSIS — E55.9 VITAMIN D DEFICIENCY: ICD-10-CM

## 2024-11-25 RX ORDER — ERGOCALCIFEROL 1.25 MG/1
50000 CAPSULE ORAL
Qty: 12 CAPSULE | Refills: 0 | Status: SHIPPED | OUTPATIENT
Start: 2024-11-25

## 2024-11-27 ENCOUNTER — E-VISIT (OUTPATIENT)
Dept: FAMILY MEDICINE | Facility: CLINIC | Age: 44
End: 2024-11-27
Payer: COMMERCIAL

## 2024-11-27 DIAGNOSIS — J06.9 VIRAL URI WITH COUGH: Primary | ICD-10-CM

## 2024-11-27 PROCEDURE — 99421 OL DIG E/M SVC 5-10 MIN: CPT | Mod: ,,,

## 2024-11-27 RX ORDER — PROMETHAZINE HYDROCHLORIDE AND DEXTROMETHORPHAN HYDROBROMIDE 6.25; 15 MG/5ML; MG/5ML
5 SYRUP ORAL EVERY 6 HOURS PRN
Qty: 118 ML | Refills: 0 | Status: SHIPPED | OUTPATIENT
Start: 2024-11-27 | End: 2024-12-07

## 2024-11-27 NOTE — ASSESSMENT & PLAN NOTE
Start  Start zpack as prescribed.   Rx trial of promethazine-dextromethorphan (PROMETHAZINE-DM) 6.25-15 mg/5 mL Syrp; Take 5 mLs by mouth every 8 (eight) hours as needed (cough).    Use OTC Tylenol or Advil for fever or body aches.  Drink plenty of fluids to thin secretions. Get plenty of rest.  Sore Throat: Use OTC lozenges or throat sprays, gargle with warm salt and water, warm tea with honey.  Nasal Congestion: Nasal washings daily and as needed. Use a humidifier to moisten air when sleeping or steamy shower.  Report fever greater than 101 F, breathing problems, painful or worsening cough, or changes in mucous (green, bloody).  Cover your mouth with coughing, wear a mask, wash your hand before eating or touching your face. Avoid others who are ill.

## 2024-11-27 NOTE — PROGRESS NOTES
Patient ID: [id number]     Chief Complaint: Cough, and Sore Throat    HPI:     Doreen Dorman is a 44 y.o. female here today for a problem visit. Complaining of productive cough, nasal congestion, runny nose, PND and sore throat x 2 days.       Past Medical History:   Diagnosis Date    Anemia     Anxiety     Hyperthyroidism         Past Surgical History:   Procedure Laterality Date    CARDIAC VALVE REPLACEMENT      CERVICAL SPINE SURGERY N/A 08/11/2022    FRACTURE SURGERY  8/11/2022    NASAL SEPTOPLASTY      SPINE SURGERY  8/11/2022        Social History     Socioeconomic History    Marital status: Single   Tobacco Use    Smoking status: Never     Passive exposure: Never    Smokeless tobacco: Never   Substance and Sexual Activity    Alcohol use: Never    Drug use: Never    Sexual activity: Yes     Partners: Male     Birth control/protection: None     Social Drivers of Health     Financial Resource Strain: Medium Risk (2/21/2024)    Overall Financial Resource Strain (CARDIA)     Difficulty of Paying Living Expenses: Somewhat hard   Food Insecurity: Food Insecurity Present (2/21/2024)    Hunger Vital Sign     Worried About Running Out of Food in the Last Year: Sometimes true     Ran Out of Food in the Last Year: Sometimes true   Transportation Needs: No Transportation Needs (2/21/2024)    PRAPARE - Transportation     Lack of Transportation (Medical): No     Lack of Transportation (Non-Medical): No   Physical Activity: Sufficiently Active (2/21/2024)    Exercise Vital Sign     Days of Exercise per Week: 4 days     Minutes of Exercise per Session: 60 min   Stress: Stress Concern Present (2/21/2024)    [employer] [place] [employer] - Occupational Stress Questionnaire     Feeling of Stress : Very much   Housing Stability: Low Risk  (2/21/2024)    Housing Stability Vital Sign     Unable to Pay for Housing in the Last Year: No     Number of Places Lived in the Last Year: 1     Unstable Housing in the  Last Year: No        Current Outpatient Medications   Medication Instructions    albuterol (VENTOLIN HFA) 90 mcg/actuation inhaler 2 puffs, Inhalation, Every 6 hours PRN, Rescue    butalbital-acetaminophen-caffeine -40 mg (FIORICET, ESGIC) -40 mg per tablet 1 tablet, Oral, Every 6 hours PRN    cetirizine (ZYRTEC) 10 mg, Oral, Daily    clindamycin-benzoyl peroxide gel 45 g, Topical (Top), Daily    cyclobenzaprine (FLEXERIL) 10 mg, Oral, 2 times daily PRN    diclofenac (VOLTAREN) 75 mg, Oral, 2 times daily    ergocalciferol (ERGOCALCIFEROL) 50,000 Units, Oral, Every 7 days    fluticasone propionate (FLONASE) 50 mcg/actuation nasal spray SPRAY TWICE IN EACH NOSTRIL ONCE DAILY    methIMAzole (TAPAZOLE) 7.5 mg, Oral, Daily    nystatin (MYCOSTATIN) 30 g, Topical (Top), 2 times daily    ondansetron (ZOFRAN-ODT) 4 mg, Oral    progesterone (PROMETRIUM) 200 mg, Oral, Every 14 days    terbinafine HCL (LAMISIL) 250 mg, Oral, Daily    tiZANidine (ZANAFLEX) 2 mg, Oral, 2 times daily       Review of patient's allergies indicates:  No Known Allergies       Assessment:       ICD-10-CM ICD-9-CM   1. Viral URI with cough  J06.9 465.9        Plan:     1. Viral URI with cough    Start zpack as prescribed. Discontinue codeine-guaifenesin.  Rx trial of promethazine-dextromethorphan (PROMETHAZINE-DM) 6.25-15 mg/5 mL Syrp; Take 5 mLs by mouth every 8 (eight) hours as needed (cough).    Flonase as prescribed.  Use OTC Tylenol or Advil for fever or body aches.  Drink plenty of fluids to thin secretions. Get plenty of rest.  Sore Throat: Use OTC lozenges or throat sprays, gargle with warm salt and water, warm tea with honey.  Nasal Congestion: Nasal washings daily and as needed. Use a humidifier to moisten air when sleeping or steamy shower.  Report fever greater than 101 F, breathing problems, painful or worsening cough, or changes in mucous (green, bloody).  Cover your mouth with coughing, wear a mask, wash your hand before  eating or touching your face. Avoid others who are ill.       Follow up as needed.   In addition to their scheduled follow up, the patient has also been instructed to follow up on as needed basis.     Future Appointments   Date Time Provider Department Center   12/4/2024 10:30 AM [facility] US[facility] [place] [facility]   3/5/2025  1:40 PM Sharon Ybarra PA [facility] [place] [facility]   5/22/2025 10:15 AM [facility] MAMMO3 DX1 [facility] [place] [facility]   5/22/2025 11:00 AM [facility] US1 [facility] [place] [facility]   8/5/2025  2:00 PM Thelma Dougherty MD [facility] ANN [facility]         F[facility]

## 2024-12-02 ENCOUNTER — OFFICE VISIT (OUTPATIENT)
Dept: FAMILY MEDICINE | Facility: CLINIC | Age: 44
End: 2024-12-02
Payer: COMMERCIAL

## 2024-12-02 VITALS
HEIGHT: 64 IN | WEIGHT: 178.81 LBS | DIASTOLIC BLOOD PRESSURE: 76 MMHG | BODY MASS INDEX: 30.53 KG/M2 | SYSTOLIC BLOOD PRESSURE: 110 MMHG | HEART RATE: 83 BPM | OXYGEN SATURATION: 99 %

## 2024-12-02 DIAGNOSIS — J01.00 ACUTE MAXILLARY SINUSITIS, RECURRENCE NOT SPECIFIED: ICD-10-CM

## 2024-12-02 DIAGNOSIS — J01.00 ACUTE MAXILLARY SINUSITIS, RECURRENCE NOT SPECIFIED: Primary | ICD-10-CM

## 2024-12-02 DIAGNOSIS — R07.89 CHEST TIGHTNESS: ICD-10-CM

## 2024-12-02 DIAGNOSIS — B37.31 VAGINAL CANDIDA: ICD-10-CM

## 2024-12-02 PROCEDURE — 3078F DIAST BP <80 MM HG: CPT | Mod: CPTII,,,

## 2024-12-02 PROCEDURE — 1160F RVW MEDS BY RX/DR IN RCRD: CPT | Mod: CPTII,,,

## 2024-12-02 PROCEDURE — G2211 COMPLEX E/M VISIT ADD ON: HCPCS | Mod: ,,,

## 2024-12-02 PROCEDURE — 1159F MED LIST DOCD IN RCRD: CPT | Mod: CPTII,,,

## 2024-12-02 PROCEDURE — 3008F BODY MASS INDEX DOCD: CPT | Mod: CPTII,,,

## 2024-12-02 PROCEDURE — 99214 OFFICE O/P EST MOD 30 MIN: CPT | Mod: ,,,

## 2024-12-02 PROCEDURE — 3044F HG A1C LEVEL LT 7.0%: CPT | Mod: CPTII,,,

## 2024-12-02 PROCEDURE — 3074F SYST BP LT 130 MM HG: CPT | Mod: CPTII,,,

## 2024-12-02 RX ORDER — DOXYCYCLINE 100 MG/1
100 CAPSULE ORAL EVERY 12 HOURS
Qty: 20 CAPSULE | Refills: 0 | Status: SHIPPED | OUTPATIENT
Start: 2024-12-02 | End: 2024-12-12

## 2024-12-02 RX ORDER — FLUCONAZOLE 150 MG/1
150 TABLET ORAL DAILY
Qty: 1 TABLET | Refills: 1 | Status: SHIPPED | OUTPATIENT
Start: 2024-12-02

## 2024-12-02 RX ORDER — LEVOCETIRIZINE DIHYDROCHLORIDE 5 MG/1
5 TABLET, FILM COATED ORAL NIGHTLY
Qty: 90 TABLET | Refills: 0 | Status: SHIPPED | OUTPATIENT
Start: 2024-12-02

## 2024-12-02 RX ORDER — LEVOCETIRIZINE DIHYDROCHLORIDE 5 MG/1
5 TABLET, FILM COATED ORAL NIGHTLY
Qty: 30 TABLET | Refills: 0 | Status: SHIPPED | OUTPATIENT
Start: 2024-12-02 | End: 2024-12-02

## 2024-12-02 RX ORDER — ALBUTEROL SULFATE 90 UG/1
2 INHALANT RESPIRATORY (INHALATION) EVERY 6 HOURS PRN
Qty: 18 G | Refills: 0 | Status: SHIPPED | OUTPATIENT
Start: 2024-12-02 | End: 2025-12-02

## 2024-12-02 NOTE — PROGRESS NOTES
Patient ID: [id number]     Chief Complaint: Cough, congestion, sinus pain    HPI:     Doreen Dorman is a 44 y.o. female here today for a problem visit. Complaining of productive cough, headache, post nasal drip, sinus and nasal congestion, rhinorrhea and sore throat x 10 days. She reports nausea, fever x1 and chills. Exposure to sick contact in her work setting. She reports treating her symptoms with rx cough medication with no relief.      Past Medical History:   Diagnosis Date    Anemia     Anxiety     Hyperthyroidism         Past Surgical History:   Procedure Laterality Date    CARDIAC VALVE REPLACEMENT      CERVICAL SPINE SURGERY N/A 08/11/2022    FRACTURE SURGERY  8/11/2022    NASAL SEPTOPLASTY      SPINE SURGERY  8/11/2022        Social History     Socioeconomic History    Marital status: Single   Tobacco Use    Smoking status: Never     Passive exposure: Never    Smokeless tobacco: Never   Substance and Sexual Activity    Alcohol use: Never    Drug use: Never    Sexual activity: Yes     Partners: Male     Birth control/protection: None     Social Drivers of Health     Financial Resource Strain: Medium Risk (2/21/2024)    Overall Financial Resource Strain (CARDIA)     Difficulty of Paying Living Expenses: Somewhat hard   Food Insecurity: Food Insecurity Present (2/21/2024)    Hunger Vital Sign     Worried About Running Out of Food in the Last Year: Sometimes true     Ran Out of Food in the Last Year: Sometimes true   Transportation Needs: No Transportation Needs (2/21/2024)    PRAPARE - Transportation     Lack of Transportation (Medical): No     Lack of Transportation (Non-Medical): No   Physical Activity: Sufficiently Active (2/21/2024)    Exercise Vital Sign     Days of Exercise per Week: 4 days     Minutes of Exercise per Session: 60 min   Stress: Stress Concern Present (2/21/2024)    [place] [employer] - Occupational Stress Questionnaire     Feeling of  Stress : Very much   Housing Stability: Low Risk  (2/21/2024)    Housing Stability Vital Sign     Unable to Pay for Housing in the Last Year: No     Number of Places Lived in the Last Year: 1     Unstable Housing in the Last Year: No        Current Outpatient Medications   Medication Instructions    albuterol (VENTOLIN HFA) 90 mcg/actuation inhaler 2 puffs, Inhalation, Every 6 hours PRN, Rescue    butalbital-acetaminophen-caffeine -40 mg (FIORICET, ESGIC) -40 mg per tablet 1 tablet, Oral, Every 6 hours PRN    clindamycin-benzoyl peroxide gel 45 g, Daily    cyclobenzaprine (FLEXERIL) 10 mg, 2 times daily PRN    diclofenac (VOLTAREN) 75 mg, 2 times daily    ergocalciferol (ERGOCALCIFEROL) 50,000 Units, Oral, Every 7 days    fluconazole (DIFLUCAN) 150 mg, Oral, Daily, Repeat dosing after 72 hours if no relief in symptoms.    fluticasone propionate (FLONASE) 50 mcg/actuation nasal spray SPRAY TWICE IN EACH NOSTRIL ONCE DAILY    levocetirizine (XYZAL) 5 mg, Oral, Nightly    methIMAzole (TAPAZOLE) 7.5 mg, Daily    nystatin (MYCOSTATIN) 30 g, 2 times daily    ondansetron (ZOFRAN-ODT) 4 mg    progesterone (PROMETRIUM) 200 mg, Every 14 days    tiZANidine (ZANAFLEX) 2 mg, 2 times daily       Review of patient's allergies indicates:  No Known Allergies     Patient Care Team:  Thelma Dougherty MD as PCP - General (Family Medicine)  Sharon Ybarra PA as Physician Assistant (General Surgery)  Amalia Ross MD as Consulting Physician (Obstetrics and Gynecology)  Amalia Ross MD as Consulting Physician (Obstetrics and Gynecology)  Nu Yeager, ALEK as Registered Nurse (Diagnostic Radiology)     Subjective:     Review of Systems   Constitutional:  Positive for chills and fever.   HENT:  Positive for congestion, postnasal drip, rhinorrhea, sinus pressure, sinus pain and sore throat.    Eyes:  Negative for visual disturbance.   Respiratory:  Positive for cough and chest tightness (felt  "when coughing). Negative for shortness of breath.    Cardiovascular:  Negative for chest pain, palpitations and leg swelling.   Gastrointestinal:  Positive for nausea. Negative for abdominal pain, constipation, diarrhea and vomiting.   Endocrine: Negative.    Genitourinary:  Negative for dysuria.   Musculoskeletal: Negative.    Neurological:  Positive for headaches. Negative for dizziness and light-headedness.   Psychiatric/Behavioral:  Negative for self-injury, sleep disturbance and suicidal ideas.      12 point review of systems conducted, negative except as stated in the history of present illness. See HPI for details.    Objective:     Visit Vitals  /76 (BP Location: Left arm, Patient Position: Sitting)   Pulse 83   Ht 5' 4" (1.626 m)   Wt 81.1 kg (178 lb 12.8 oz)   LMP 11/09/2024 (Exact Date)   SpO2 99%   BMI 30.69 kg/m²       Physical Exam  Constitutional:       General: She is not in acute distress.  HENT:      Right Ear: Tympanic membrane and ear canal normal.      Left Ear: Tympanic membrane and ear canal normal.      Nose: Congestion and rhinorrhea present.      Right Turbinates: Swollen.      Left Turbinates: Swollen.      Right Sinus: Maxillary sinus tenderness present.      Left Sinus: Maxillary sinus tenderness present.      Comments: Thick green mucous noted in right nare.      Mouth/Throat:      Mouth: Mucous membranes are moist.      Pharynx: Uvula midline. Postnasal drip present. No oropharyngeal exudate.   Eyes:      Extraocular Movements: Extraocular movements intact.      Conjunctiva/sclera: Conjunctivae normal.      Pupils: Pupils are equal, round, and reactive to light.   Cardiovascular:      Rate and Rhythm: Normal rate and regular rhythm.      Pulses: Normal pulses.      Heart sounds: Normal heart sounds, S1 normal and S2 normal.   Pulmonary:      Effort: Pulmonary effort is normal.      Breath sounds: Normal breath sounds. No wheezing, rhonchi or rales.   Abdominal:      General: " Bowel sounds are normal.   Musculoskeletal:         General: Normal range of motion.      Cervical back: Normal range of motion and neck supple.   Lymphadenopathy:      Cervical: No cervical adenopathy.   Skin:     General: Skin is warm.      Capillary Refill: Capillary refill takes less than 2 seconds.      Findings: No rash.   Neurological:      General: No focal deficit present.      Mental Status: She is alert and oriented to person, place, and time.   Psychiatric:         Mood and Affect: Mood normal.         Behavior: Behavior normal.       Assessment:       ICD-10-CM ICD-9-CM   1. Acute maxillary sinusitis, recurrence not specified  J01.00 461.0   2. Chest tightness  R07.89 786.59   3. Vaginal candida  B37.31 112.1        Plan:     1. Acute maxillary sinusitis, recurrence not specified  Assessment & Plan:  Rx doxycycline as prescribed. Continue cough syrup and Flonase as prescribed  Avoid irritants and allergens.  Wash hands frequently to prevent spread of bacteria.  Drink plenty of fluids to thin mucus/secretions and use humidifier.  Consider NeilMed Nasal Saline Rinse twice a day.  Apply warm compresses to relieve sinus pressure/pain.         Orders:  -     albuterol (VENTOLIN HFA) 90 mcg/actuation inhaler; Inhale 2 puffs into the lungs every 6 (six) hours as needed for Wheezing. Rescue  Dispense: 18 g; Refill: 0  -     doxycycline (VIBRAMYCIN) 100 MG Cap; Take 1 capsule (100 mg total) by mouth every 12 (twelve) hours. for 10 days  Dispense: 20 capsule; Refill: 0  -     Discontinue: levocetirizine (XYZAL) 5 MG tablet; Take 1 tablet (5 mg total) by mouth every evening.  Dispense: 30 tablet; Refill: 0    2. Chest tightness  -     albuterol (VENTOLIN HFA) 90 mcg/actuation inhaler; Inhale 2 puffs into the lungs every 6 (six) hours as needed for Wheezing. Rescue  Dispense: 18 g; Refill: 0  -     X-Ray Chest PA And Lateral; Future; Expected date: 12/02/2024    3. Vaginal candida  -     fluconazole (DIFLUCAN) 150  MG Tab; Take 1 tablet (150 mg total) by mouth once daily. Repeat dosing after 72 hours if no relief in symptoms.  Dispense: 1 tablet; Refill: 1             Follow up in one week if symptoms do not improve. In addition to their scheduled follow up, the patient has also been instructed to follow up on as needed basis.     Future Appointments   Date Time Provider Department Center   12/18/2024 11:00 AM Sharon Ybarra PA [facility]    3/5/2025  1:40 PM Sharon Ybarra PA [facility] [facility]   5/2/2025 10:00 AM [facility] MRI1 420 LB LIMIT [facility] MRI [facility]   5/22/2025 10:15 AM [facility] BREAST [place] MAMMO3 DX1 [facility] [place] [facility]   5/22/2025 11:00 AM [facility] BREAST CENTER US1 [facility] US [place] [facility]   8/5/2025  2:00 PM Thelma Dougherty MD [facility]

## 2024-12-02 NOTE — PROGRESS NOTES
Patient ID: [id number]     Chief Complaint: No chief complaint on file.      HPI:     Doreen Dorman is a 44 y.o. female here today for a follow up. No other complaints today.           Past Medical History:   Diagnosis Date    Anemia     Anxiety     Hyperthyroidism         Past Surgical History:   Procedure Laterality Date    CARDIAC VALVE REPLACEMENT      CERVICAL SPINE SURGERY N/A 08/11/2022    FRACTURE SURGERY  8/11/2022    NASAL SEPTOPLASTY      SPINE SURGERY  8/11/2022        Social History     Tobacco Use    Smoking status: Never     Passive exposure: Never    Smokeless tobacco: Never   Substance and Sexual Activity    Alcohol use: Never    Drug use: Never    Sexual activity: Yes     Partners: Male     Birth control/protection: None        Current Outpatient Medications   Medication Instructions    albuterol (VENTOLIN HFA) 90 mcg/actuation inhaler 2 puffs, Inhalation, Every 6 hours PRN, Rescue    butalbital-acetaminophen-caffeine -40 mg (FIORICET, ESGIC) -40 mg per tablet 1 tablet, Oral, Every 6 hours PRN    cetirizine (ZYRTEC) 10 mg, Oral, Daily    clindamycin-benzoyl peroxide gel 45 g, Topical (Top), Daily    cyclobenzaprine (FLEXERIL) 10 mg, Oral, 2 times daily PRN    diclofenac (VOLTAREN) 75 mg, Oral, 2 times daily    ergocalciferol (ERGOCALCIFEROL) 50,000 Units, Oral, Every 7 days    fluticasone propionate (FLONASE) 50 mcg/actuation nasal spray SPRAY TWICE IN EACH NOSTRIL ONCE DAILY    methIMAzole (TAPAZOLE) 7.5 mg, Oral, Daily    nystatin (MYCOSTATIN) 30 g, Topical (Top), 2 times daily    ondansetron (ZOFRAN-ODT) 4 mg, Oral    progesterone (PROMETRIUM) 200 mg, Oral, Every 14 days    promethazine-dextromethorphan (PROMETHAZINE-DM) 6.25-15 mg/5 mL Syrp 5 mLs, Oral, Every 6 hours PRN    terbinafine HCL (LAMISIL) 250 mg, Oral, Daily    tiZANidine (ZANAFLEX) 2 mg, Oral, 2 times daily       Review of patient's allergies indicates:  No Known Allergies     Patient Care Team:  Farhat  Thelma BRAVO MD as PCP - General (Family Medicine)  Sharon Ybarra PA as Physician Assistant (General Surgery)  Amalia Ross MD as Consulting Physician (Obstetrics and Gynecology)  Amalia Ross MD as Consulting Physician (Obstetrics and Gynecology)  Nu Yeager, RN as Registered Nurse (Diagnostic Radiology)     Subjective:     Review of Systems    12 point review of systems conducted, negative except as stated in the history of present illness. See HPI for details.    Objective:     There were no vitals taken for this visit.    Physical Exam    Labs Reviewed:     Chemistry:  Lab Results   Component Value Date     07/29/2024    K 4.1 07/29/2024    BUN 10.5 07/29/2024    CREATININE 0.85 07/29/2024    EGFRNORACEVR >60 07/29/2024    GLUCOSE 96 07/29/2024    CALCIUM 9.4 07/29/2024    ALKPHOS 69 07/29/2024    LABPROT 7.0 07/29/2024    ALBUMIN 4.0 07/29/2024    AST 21 07/29/2024    ALT 19 07/29/2024    YMSZEJDW80ID 16 (L) 07/29/2024    TSH 1.606 07/29/2024        Lab Results   Component Value Date    HGBA1C 4.8 07/29/2024        Hematology:  Lab Results   Component Value Date    WBC 8.23 08/16/2024    HGB 12.2 08/16/2024    HCT 39.9 08/16/2024     (L) 08/16/2024       Lipid Panel:  Lab Results   Component Value Date    CHOL 205 (H) 07/29/2024    HDL 56 08/26/2024    .00 (H) 07/29/2024    TRIG 120 08/26/2024    TOTALCHOLEST 4 07/29/2024        Urine:  Lab Results   Component Value Date    APPEARANCEUA Clear 08/16/2024    SGUA 1.023 08/16/2024    PROTEINUA Negative 08/16/2024    KETONESUA Negative 08/16/2024    LEUKOCYTESUR Negative 08/16/2024    RBCUA 0-5 08/16/2024    WBCUA 0-5 08/16/2024    BACTERIA None Seen 08/16/2024    SQEPUA Trace 08/16/2024        Assessment:     No diagnosis found.     Plan:     {There are no diagnoses linked to this encounter. (Refresh or delete this SmartLink)}     No follow-ups on file. In addition to their scheduled follow up, the patient has also been  instructed to follow up on as needed basis.     Future Appointments   Date Time Provider Department Center   12/2/2024  2:45 PM Ana Paula Anthony FNP [facility]    12/4/2024 10:30 AM [facility] US3 BX [facility]   3/5/2025  1:40 PM Sharon Ybarra PA [facility]   5/22/2025 10:15 AM [facility] MAMMO3 DX1 [facility] [place] [facility]   5/22/2025 11:00 AM [facility] US1 [facility]   8/5/2025  2:00 PM Thelma Dougherty MD [facility]         FIONA Singer

## 2024-12-03 ENCOUNTER — HOSPITAL ENCOUNTER (OUTPATIENT)
Dept: RADIOLOGY | Facility: HOSPITAL | Age: 44
Discharge: HOME | End: 2024-12-03
Payer: COMMERCIAL

## 2024-12-03 DIAGNOSIS — R07.89 CHEST TIGHTNESS: ICD-10-CM

## 2024-12-03 PROCEDURE — 71046 X-RAY EXAM CHEST 2 VIEWS: CPT | Mod: TC

## 2024-12-04 ENCOUNTER — HOSPITAL ENCOUNTER (OUTPATIENT)
Dept: RADIOLOGY | Facility: HOSPITAL | Age: 44
Discharge: HOME | End: 2024-12-04
Attending: PHYSICIAN ASSISTANT
Payer: COMMERCIAL

## 2024-12-04 ENCOUNTER — PATIENT MESSAGE (OUTPATIENT)
Dept: FAMILY MEDICINE | Facility: CLINIC | Age: 44
End: 2024-12-04
Payer: COMMERCIAL

## 2024-12-04 DIAGNOSIS — R92.8 ABNORMAL MRI, BREAST: ICD-10-CM

## 2024-12-04 DIAGNOSIS — R92.8 ABNORMAL MRI, BREAST: Primary | ICD-10-CM

## 2024-12-04 PROCEDURE — 77065 DX MAMMO INCL CAD UNI: CPT | Mod: TC,LT

## 2024-12-04 PROCEDURE — 27000550 US BREAST BIOPSY WITH IMAGING 1ST SITE LEFT

## 2024-12-05 DIAGNOSIS — N64.89 COMPLEX SCLEROSING LESION OF LEFT BREAST: Primary | ICD-10-CM

## 2024-12-05 LAB — PSYCHE PATHOLOGY RESULT: NORMAL

## 2024-12-09 PROBLEM — R07.89 CHEST TIGHTNESS: Status: ACTIVE | Noted: 2024-12-09

## 2024-12-09 PROBLEM — B37.31 VAGINAL CANDIDA: Status: ACTIVE | Noted: 2024-12-09

## 2024-12-09 PROBLEM — J01.00 ACUTE MAXILLARY SINUSITIS: Status: ACTIVE | Noted: 2024-12-09

## 2024-12-09 PROBLEM — U07.1 COVID-19: Status: ACTIVE | Noted: 2024-12-09

## 2024-12-17 NOTE — PROGRESS NOTES
[facility] Breast Surg  Breast Surgical Oncology  New Patient Office Visit - H&P      Referring Provider: Dr. Thelma Dougherty  PCP: Thelma Dougherty MD   Care Team:    Chief Complaint:   Chief Complaint   Patient presents with    Follow-up     Patient denies any breast related concerns on today       Subjective:     Interval History:  12/18/2024 - Doreen Dorman returns today for surgical consultation regarding a biopsy-proven high-risk benign complex sclerosing lesion in the 10:00 left breast 3 cm from the nipple.     HPI:  Doreen Dorman is a 44 y.o. female who presents on 8/27/2024 for evaluation of left breast masses, which were initially found on screening mammogram in May 2023 and were recommended for close monitoring with diagnostic imaging. She returned for mammogram/US in June 2023 now with a palpable concern in the left and one of the masses was noted to have increased in size. This was biopsied, and results showed benign fat necrosis. Close interval follow up imaging of this biopsied mass as well as other areas of concern have since shown stability for 1 year. She is currently recommended for a follow up mammogram scheduled in May 2024 to complete 2 years of follow up since the masses were first found.      Since the biopsy in June 2023, she has remained asymptomatic and feels that the fat necrosis lump has gradually shrunk in size. However, about 1 month ago, she felt a soft lump in the 10:00 axis of the left breast about 4 cmfn. She noticed it around the time of menstruation and it has since disappeared. She has not felt any lumps since this visit. She currently denies any breast issues including rashes, redness, pain, swelling, nipple discharge, or new lumps/masses. She does have a history of fibrocystic breasts and occasionally has breast pains. She has regular menstrual cycles s/t progesterone supplementation to induce ovulation, which she  takes as an alternative to birth control.    She does report a family history of breast cancer including diagnosis in her half sister. Her lifetime risk was assessed and found to be elevated at 33.4%.    Imagin2023 BL DG MG and BL Breast US Complete - BIRADS 3:   1. Oval, mixed echogenicity mass with indistinct margins seen sonographically in the 11:00 left breast, 6 cm from the nipple, without a mammographic correlate is probably benign.  This is favored to represent fat necrosis in the setting of trauma in this region.  A follow-up targeted left breast ultrasound and possible left diagnostic mammogram in 3 months is recommended.  2. Oval, anechoic mass with circumscribed margins measuring up to 22 mm in the 10:00 left breast, 3 cm from the nipple, is probably benign.  This is favored to represent a cyst cluster.  Attention at time of follow-up is recommended.  3. Oval, hypoechoic mass with circumscribed margins measuring up to 9 mm in the 4:00 left breast, 4 cm from the nipple, is probably benign.  It is favored to represent a complicated cyst versus a fibroadenoma.  Attention at time of follow-up is recommended.  2023 L DG MG and L breast US - BIRADS 4:   1. Oval, mixed echogenicity mass with indistinct margins measuring up to 12 mm in the 11:00 left breast, 6 cm from the nipple, is now considered suspicious given that it is now palpable and given the interval increase in size.  Left breast ultrasound-guided core needle biopsy is recommended. (Results below - benign and a follow-up left diagnostic mammogram and targeted left breast ultrasound is recommended in 6 months to demonstrate stability of the additional masses assessed as probably benign)  2. Oval, anechoic mass with circumscribed margins measuring up to 20 mm in the 10:00 left breast, 3 cm from the nipple, is not significantly changed compared to the 2023 examination and thus remains probably benign.  Final recommendations regarding  this mass will be made once the above biopsy results become available.  3. Oval, hypoechoic mass with circumscribed margins measuring up to 9 mm in the 4:00 left breast, 4 cm from the nipple, is not significantly changed compared to the 05/23/2023 examination and thus remains probably benign.  Final recommendations regarding this mass will be made once the above biopsy results become available.    1/17/2024 L DG MG and L Breast US complete - BIRADS 3:  1.  Left breast 10:00 3 cm from the nipple posterior depth grouping of avascular anechoic masses with circumscribed margins, together spanning approximately 2.6 cm, is not significantly changed compared to previous exams dating back to 5/23/2023 (8 months ago) and remains probably benign.  This is again favored to represent a cyst cluster.  2.  Left breast 4:00 4 cm from the nipple 1.0 cm avascular mixed echogenicity mass with circumscribed margins is not significantly changed compared to previous exams dating back to 5/23/2023 (8 months ago) and remains probably benign.  This is again most likely a complicated cyst versus a fibroadenoma.  3.  Multiple additional benign cysts of varying size and complication in the left breast, several of which are new or increased in size compared to previous exams.  The largest of these benign cysts in the 3:00 left breast 4 cm from the nipple measures 1.7 cm.  4.  Prior benign core needle biopsy of the 11:00 left breast 6 cm from the nipple on 7/17/2023 (organizing fat necrosis), with no detrimental interval change at the biopsy site.  RECOMMENDATIONS: Bilateral diagnostic mammography with tomosynthesis, followed by targeted left breast ultrasound, will be due in 4 months as part of the two year follow-up process for probably benign findings (May 2024).    5/21/2024 BL DG MG and L breast US - BIRADS 3:   1) Left breast 10:00 axis 3 cm FN 1.7 x 0.7 x 2.7 cm grouping of oval parallel hypoechoic circumscribed avascular masses is not  significantly changed compared to prior mammograms and ultrasounds dating back to 2023 (1 year ago) and remains probably benign.  BI-RADS 3: Probably benign.    2) Left breast 4:00 axis 4 cm FN 0.8 x 0.5 x 0.9 cm oval parallel hypoechoic/anechoic circumscribed avascular mass is stable compared to prior ultrasounds dating back to 2023 and remains probably benign.  Favor this to represent a complicated cyst.  BI-RADS 3: Probably benign.    3) No mammographic evidence of malignancy in the right breast.  4) Cystic mastopathy of the left breast is benign.  BI-RADS 2: Benign.  RECOMMENDATIONS: Recommend continued follow-up of the left breast 10:00 axis 3 cm FN and left breast 4:00 axis 4 cm FN probably benign findings according to the standard BI-RADS 3 protocol.  Next exam due is bilateral diagnostic mammogram and limited left breast ultrasound in May, 2025.  That exam will represent 2 years of follow-up from the time of initial probably benign assessment.    Pathology:   2023 LEFT BREAST 11 O'CLOCK 6 CMFN AREA OF PALP:   ORGANIZING FAT NECROSIS. NO EVIDENCE OF MALIGNANCY.     OB/GYN History:  Age at Menarche Onset: 12  Menopausal Status: premenopausal, LMP: Patient's last menstrual period was 2024 (exact date).  Hysterectomy/Oophorectomy: no, neither  Hormonal birth control (duration): none  Pregnancy History:   Age at first live birth: n/a  Hormone Replacement Therapy: No, none    Other:  MG breast density: BIRADS C  Prior thoracic RT: none  Genetic testing: none  Ashkenazi Rastafari descent: No    Family History:  Family History   Problem Relation Name Age of Onset    Arthritis Mother Charline Dorman     No Known Problems Father      Breast cancer Other Merline Plaine 45        great aunt    Breast cancer Half-sister Rachelle Koch         paternal        Patient History:  Past Medical History:   Diagnosis Date    Anemia     Anxiety     Hyperthyroidism        Active Problem List with Overview  Notes    Diagnosis Date Noted    Complex sclerosing lesion of left breast 12/18/2024    Fibrocystic breast changes 12/18/2024    Fat necrosis of left breast 12/18/2024    COVID-19 12/09/2024    Chest tightness 12/09/2024    Acute maxillary sinusitis 12/09/2024    Vaginal candida 12/09/2024    Viral URI with cough 11/27/2024    Abdominal lump 08/22/2024    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    Umbilical hernia without obstruction and without gangrene 08/22/2024    Urticaria 08/07/2024    Hyperthyroidism     Overweight (BMI 25.0-29.9) 04/17/2024    Hordeolum internum of right upper eyelid 04/17/2024    Iron deficiency anemia 05/12/2023    Low platelet count 05/12/2023        Past Surgical History:   Procedure Laterality Date    CARDIAC VALVE REPLACEMENT      CERVICAL SPINE SURGERY N/A 08/11/2022    FRACTURE SURGERY  8/11/2022    NASAL SEPTOPLASTY      SPINE SURGERY  8/11/2022       Social History     Socioeconomic History    Marital status: Single   Tobacco Use    Smoking status: Never     Passive exposure: Never    Smokeless tobacco: Never   Substance and Sexual Activity    Alcohol use: Never    Drug use: Never    Sexual activity: Yes     Partners: Male     Birth control/protection: None     Social Drivers of Health     Financial Resource Strain: Medium Risk (2/21/2024)    Overall Financial Resource Strain (CARDIA)     Difficulty of Paying Living Expenses: Somewhat hard   Food Insecurity: Food Insecurity Present (2/21/2024)    Hunger Vital Sign     Worried About Running Out of Food in the Last Year: Sometimes true     Ran Out of Food in the Last Year: Sometimes true   Transportation Needs: No Transportation Needs (2/21/2024)    PRAPARE - Transportation     Lack of Transportation (Medical): No     Lack of Transportation (Non-Medical): No   Physical Activity: Sufficiently Active (2/21/2024)    Exercise Vital Sign     Days of Exercise per Week: 4 days     Minutes of Exercise per Session: 60 min   Stress: Stress Concern Present  (2/21/2024)    [employer] [place] [employer] - Occupational Stress Questionnaire     Feeling of Stress : Very much   Housing Stability: Low Risk  (2/21/2024)    Housing Stability Vital Sign     Unable to Pay for Housing in the Last Year: No     Number of Places Lived in the Last Year: 1     Unstable Housing in the Last Year: No       Immunization History   Administered Date(s) Administered    COVID-19 MRNA, LN-S PF (MODERNA HALF 0.25 ML DOSE) 12/23/2021    COVID-19, MRNA, LN-S, PF (MODERNA FULL 0.5 ML DOSE) 03/05/2021, 04/05/2021    DTaP 1982, 1982, 02/25/1983, 11/08/1984, 08/27/1987    MMR 11/08/1984    OPV 1982, 1982, 02/25/1983, 11/08/1984, 08/27/1987    PPD Test 04/06/2000    Tdap 05/14/2021       Medications/Allergies:    Current Outpatient Medications:     albuterol (VENTOLIN HFA) 90 mcg/actuation inhaler, Inhale 2 puffs into the lungs every 6 (six) hours as needed for Wheezing. Rescue, Disp: 18 g, Rfl: 0    clindamycin-benzoyl peroxide gel, Apply 45 g topically once daily., Disp: , Rfl:     cyclobenzaprine (FLEXERIL) 10 MG tablet, Take 10 mg by mouth 2 (two) times daily as needed., Disp: , Rfl:     ergocalciferol (ERGOCALCIFEROL) 50,000 unit Cap, TAKE 1 CAPSULE BY MOUTH EVERY 7 DAYS, Disp: 12 capsule, Rfl: 0    fluconazole (DIFLUCAN) 150 MG Tab, Take 1 tablet (150 mg total) by mouth once daily. Repeat dosing after 72 hours if no relief in symptoms., Disp: 1 tablet, Rfl: 1    fluticasone propionate (FLONASE) 50 mcg/actuation nasal spray, SPRAY TWICE IN EACH NOSTRIL ONCE DAILY, Disp: , Rfl:     levocetirizine (XYZAL) 5 MG tablet, TAKE 1 TABLET(5 MG) BY MOUTH EVERY EVENING, Disp: 90 tablet, Rfl: 0    nystatin (MYCOSTATIN) cream, Apply 30 g topically 2 (two) times daily., Disp: , Rfl:     ondansetron (ZOFRAN-ODT) 4 MG TbDL, Take 4 mg by mouth., Disp: , Rfl:     progesterone (PROMETRIUM) 200 MG capsule, Take 200 mg by mouth every 14 (fourteen) days., Disp: , Rfl:      "butalbital-acetaminophen-caff (FIORICET) -40 mg Cap, 1 capsule as needed Orally every 4 hrs, Disp: , Rfl:     diclofenac (VOLTAREN) 75 MG EC tablet, 75 mg., Disp: , Rfl:     ferrous gluconate 324 mg (37.5 mg iron) Tab tablet, 324 mg., Disp: , Rfl:     loratadine (CLARITIN) 10 mg tablet, 10 mg., Disp: , Rfl:     methIMAzole (TAPAZOLE) 5 MG Tab, 5 mg., Disp: , Rfl:     tiZANidine (ZANAFLEX) 4 mg Cap, 1 capsule as needed Orally once a day, Disp: , Rfl:      Review of patient's allergies indicates:  No Known Allergies    Review of Systems:  ROS     Objective:     Vitals:  Vitals:    12/18/24 1107   BP: 117/79   BP Location: Left arm   Patient Position: Sitting   Pulse: 81   Resp: 18   Temp: 98.5 °F (36.9 °C)   TempSrc: Oral   Weight: 80.9 kg (178 lb 6.4 oz)   Height: 5' 4" (1.626 m)         Body mass index is 30.62 kg/m².     Physical Exam:  General: The patient is awake, alert and oriented times three. The patient is well nourished and in no acute distress.  Neck: There is no evidence of palpable cervical, supraclavicular or axillary adenopathy. The neck is supple. The thyroid is not enlarged.  Musculoskeletal: The patient has a normal range of motion of her bilateral upper extremities.  Chest: Examination of the chest wall fails to reveal any obvious abnormalities.  The lungs are clear to auscultation bilaterally without rales, rhonchi, or wheezing.  Cardiovascular: The heart has a regular rate and rhythm without murmurs, gallops or rubs.  Breast:   Right:  Examination of right breast fails to reveal any dominant masses or areas of significant focal nodularity. The nipple is everted without evidence of discharge. There is no skin dimpling with movement of the pectoralis. There is no significant skin changes overlying the breast.   Left:  Examination of the left breast fails to reveal any dominant masses or areas of significant focal nodularity. Particularly, no palpable masses or area of focal nodularity in the " UIQ where palpable concern was previously present. The nipple is everted without evidence of discharge. There is no skin dimpling with movement of the pectoralis. There are no significant skin changes overlying the breast.  Abdomen: The abdomen is soft, flat, nontender and nondistended with no palpable masses or organomegaly.  Integumentary: no rashes or skin lesions present  Neurologic: cranial nerves intact, no signs of peripheral neurological deficit, motor/sensory function intact    Assessment:     Patient Active Problem List   Diagnosis    Iron deficiency anemia    Low platelet count    Overweight (BMI 25.0-29.9)    Hordeolum internum of right upper eyelid    Urticaria    Hyperthyroidism    Abdominal lump    Accessory spleen    Umbilical hernia without obstruction and without gangrene    Viral URI with cough    COVID-19    Chest tightness    Acute maxillary sinusitis    Vaginal candida    Complex sclerosing lesion of left breast    Fibrocystic breast changes    Fat necrosis of left breast        Crystal was seen today for follow-up.    Diagnoses and all orders for this visit:    Complex sclerosing lesion of left breast  -     Ambulatory referral/consult to Breast Surgery  -     Place in Outpatient; Standing  -     Case Request Operating Room: EXCISION,MASS,BREAST,USING RADIOLOGICAL MARKER  -     Vital signs; Standing  -     Insert peripheral IV; Standing  -     Height and weight; Standing  -     Intake and output; Standing  -     Verify discontinuation of antithrombotics; Standing  -     Verify consent; Standing  -     Chlorhexidine (CHG) 2% Wipes; Standing  -     Notify Physician; Standing  -     Diet NPO; Standing  -     lactated ringers infusion  -     IP VTE LOW RISK PATIENT; Standing  -     cefazolin (ANCEF) 2 gram in dextrose 5% 50 mL IVPB (premix)  -     Basic metabolic panel; Future  -     CBC auto differential; Future  -     Pulse Oximetry Q4H; Standing  -     Full code; Standing  -     Insert  "peripheral IV    Fibrocystic breast changes, unspecified laterality    Fat necrosis of left breast    Pre-op testing  -     Basic metabolic panel; Future  -     CBC auto differential; Future  -     Pregnancy, urine rapid; Standing    Radial scars, also called complex sclerosing lesions, are a pathologic diagnosis, usually discovered incidentally when a breast mass or radiologic abnormality is removed or biopsied. Occasionally, radial scars are large enough to be detected on mammography as suspicious spiculated masses, which cannot be reliably differentiated from spiculated carcinoma by imaging alone. Radial scars are characterized microscopically by a fibroelastic core with radiating ducts and lobules.    In general, surgical excision is recommended when radial scars or complex sclerosing lesions are diagnosed on core biopsy, based on series showing that 8 to 17 percent of surgical specimens at subsequent excision are positive for malignancy. The current recommendations from the American Society of Breast Surgeons state that most radial scars "should be excised, although imaging follow-up is reasonable for small, image-detected radial scars that are completely removed or well-sampled with large-gauge devices and in the setting of imaging-pathology concordance".  No additional treatment beyond excision is needed for radial scars. The risk of subsequent breast cancer after excision in this population is small, and chemoprevention is not indicated.     Regardless of whether the patient undergoes surgical excisional biopsy of the left breast 10:00 complex sclerosing lesion, she is still recommended for a follow-up bilateral dynamic contrast-enhanced breast MRI is recommended in 6 months to ensure stability of additional morphologically similar areas of heterogeneous non-mass enhancement in both breasts. As the findings are more conspicuous/reproducible on breast MRI compared to mammogram and ultrasound, the recommended " breast MRI can be performed in lieu of the diagnostic mammogram and ultrasound to complete the 2-year follow-up process.       Plan:      After discussion, patient states she wants to proceed with surgical excision (with seed localization). Will tentatively schedule for 1/24/2025.     Patient would also like to return for follow up to discuss plan with Dr. Fong prior to surgery for consultation. This was scheduled for 1/21 when she will also have same day seed-placement.    Follow-up bilateral dynamic contrast-enhanced breast MRI is recommended in 6 months to ensure stability of additional morphologically similar areas of heterogeneous non-mass enhancement in both breasts. This has already been scheduled.       CC: Dr. Thelma Dougherty    All of her questions were answered. She was advised to call if she develops any questions or concerns.    Sharon Ybarra PA-C       --------------------------------------------------------------------------------------------------------------  Total time on the date of the visit ranged from 40-54 mins (05778). Total time includes both face-to-face and non-face-to-face time personally spent by myself on the day of the visit.    Non-face-to-face time included:  _X_ preparing to see the patient such as reviewing the patient record  __ obtaining and reviewing separately obtained history  _X_ independently interpreting results  _X_ documenting clinical information in electronic health record.    Face-to-face time included:  _X_ performing an appropriate history and examination  _X_ communicating results to the patient  _X_ counseling and educating the patient  __ ordering appropriate medications  _x_ ordering appropriate tests  _X_ ordering appropriate procedures (including follow-up)  _X_ answering any questions the patient had    Total Time spent on date of visit: 44 minutes

## 2024-12-18 ENCOUNTER — OFFICE VISIT (OUTPATIENT)
Dept: SURGERY | Facility: CLINIC | Age: 44
End: 2024-12-18
Payer: COMMERCIAL

## 2024-12-18 ENCOUNTER — TELEPHONE (OUTPATIENT)
Dept: FAMILY MEDICINE | Facility: CLINIC | Age: 44
End: 2024-12-18
Payer: COMMERCIAL

## 2024-12-18 VITALS
TEMPERATURE: 99 F | HEIGHT: 64 IN | WEIGHT: 178.38 LBS | SYSTOLIC BLOOD PRESSURE: 117 MMHG | HEART RATE: 81 BPM | RESPIRATION RATE: 18 BRPM | BODY MASS INDEX: 30.45 KG/M2 | DIASTOLIC BLOOD PRESSURE: 79 MMHG

## 2024-12-18 DIAGNOSIS — Z01.818 PRE-OP TESTING: ICD-10-CM

## 2024-12-18 DIAGNOSIS — Z56.6 STRESS AT WORK: Primary | ICD-10-CM

## 2024-12-18 DIAGNOSIS — N64.89 COMPLEX SCLEROSING LESION OF LEFT BREAST: ICD-10-CM

## 2024-12-18 DIAGNOSIS — N60.19 FIBROCYSTIC BREAST CHANGES, UNSPECIFIED LATERALITY: ICD-10-CM

## 2024-12-18 DIAGNOSIS — N64.1 FAT NECROSIS OF LEFT BREAST: ICD-10-CM

## 2024-12-18 PROCEDURE — 99999 PR PBB SHADOW E&M-EST. PATIENT-LVL V: CPT | Mod: PBBFAC,,, | Performed by: PHYSICIAN ASSISTANT

## 2024-12-18 RX ORDER — METHIMAZOLE 5 MG/1
5 TABLET ORAL
COMMUNITY

## 2024-12-18 RX ORDER — SODIUM CHLORIDE, SODIUM LACTATE, POTASSIUM CHLORIDE, CALCIUM CHLORIDE 600; 310; 30; 20 MG/100ML; MG/100ML; MG/100ML; MG/100ML
INJECTION, SOLUTION INTRAVENOUS CONTINUOUS
OUTPATIENT
Start: 2024-12-18

## 2024-12-18 RX ORDER — BUTALBITAL, ACETAMINOPHEN AND CAFFEINE 300; 40; 50 MG/1; MG/1; MG/1
CAPSULE ORAL
COMMUNITY

## 2024-12-18 RX ORDER — TIZANIDINE HYDROCHLORIDE 4 MG/1
CAPSULE, GELATIN COATED ORAL
COMMUNITY

## 2024-12-18 RX ORDER — FERROUS GLUCONATE 324(37.5)
324 TABLET ORAL
COMMUNITY

## 2024-12-18 RX ORDER — DICLOFENAC SODIUM 75 MG/1
75 TABLET, DELAYED RELEASE ORAL
COMMUNITY

## 2024-12-18 RX ORDER — CEFAZOLIN SODIUM 2 G/50ML
2 SOLUTION INTRAVENOUS
OUTPATIENT
Start: 2024-12-18

## 2024-12-18 RX ORDER — LORATADINE 10 MG/1
10 TABLET ORAL
COMMUNITY

## 2024-12-18 SDOH — SOCIAL DETERMINANTS OF HEALTH (SDOH): OTHER PHYSICAL AND MENTAL STRAIN RELATED TO WORK: Z56.6

## 2024-12-18 NOTE — ASSESSMENT & PLAN NOTE
Rx doxycycline as prescribed. Continue cough syrup and Flonase as prescribed  Avoid irritants and allergens.  Wash hands frequently to prevent spread of bacteria.  Drink plenty of fluids to thin mucus/secretions and use humidifier.  Consider NeilMed Nasal Saline Rinse twice a day.  Apply warm compresses to relieve sinus pressure/pain.

## 2024-12-18 NOTE — TELEPHONE ENCOUNTER
Pt called about the status of her referral. I voiced to the pt that the referral is pending. Pt voiced understanding and thanks.

## 2025-01-17 ENCOUNTER — HOSPITAL ENCOUNTER (OUTPATIENT)
Dept: RADIOLOGY | Facility: HOSPITAL | Age: 45
Discharge: HOME | End: 2025-01-17
Attending: SURGERY
Payer: COMMERCIAL

## 2025-01-17 DIAGNOSIS — R92.8 ABNORMAL MAMMOGRAM: ICD-10-CM

## 2025-01-17 PROCEDURE — A4648 IMPLANTABLE TISSUE MARKER: HCPCS

## 2025-01-17 PROCEDURE — 77061 BREAST TOMOSYNTHESIS UNI: CPT | Mod: 26,LT,, | Performed by: RADIOLOGY

## 2025-01-17 PROCEDURE — 77065 DX MAMMO INCL CAD UNI: CPT | Mod: 26,LT,, | Performed by: RADIOLOGY

## 2025-01-17 PROCEDURE — 19285 PERQ DEV BREAST 1ST US IMAG: CPT | Mod: LT,,, | Performed by: RADIOLOGY

## 2025-01-17 PROCEDURE — 77065 DX MAMMO INCL CAD UNI: CPT | Mod: TC,LT

## 2025-01-22 ENCOUNTER — ANESTHESIA EVENT (OUTPATIENT)
Dept: SURGERY | Facility: HOSPITAL | Age: 45
End: 2025-01-22
Payer: COMMERCIAL

## 2025-01-22 NOTE — ANESTHESIA PREPROCEDURE EVALUATION
01/22/2025  Doreen Dorman is a 44 y.o., female presents as an outpatient for excision left breast mass.      Pre-op Assessment    I have reviewed the Patient Summary Reports.     I have reviewed the Nursing Notes. I have reviewed the NPO Status.   I have reviewed the Medications.     Review of Systems  Anesthesia Hx:               Denies Personal Hx of Anesthesia complications.                    Social:  Non-Smoker       Cardiovascular:   Functional Capacity good / => 4 METS                         Endocrine:    Hyperthyroidism             Physical Exam  General: Well nourished, Cooperative, Alert and Oriented    Airway:  Mouth Opening: Normal  TM Distance: Normal  Tongue: Normal  Neck ROM: Normal ROM    Dental:  Intact    Chest/Lungs:  Clear to auscultation, Normal Respiratory Rate    Heart:  Rate: Normal  Rhythm: Regular Rhythm        Anesthesia Plan  Type of Anesthesia, risks & benefits discussed:    Anesthesia Type: Gen Supraglottic Airway  Intra-op Monitoring Plan: Standard ASA Monitors  Post Op Pain Control Plan: multimodal analgesia and IV/PO Opioids PRN  Induction:  IV  Airway Plan: Direct  Informed Consent: Informed consent signed with the Patient and all parties understand the risks and agree with anesthesia plan.  All questions answered.   ASA Score: 1  Day of Surgery Review of History & Physical: H&P Update referred to the surgeon/provider.I have interviewed and examined the patient. I have reviewed the patient's H&P dated: There are no significant changes.     Ready For Surgery From Anesthesia Perspective.     .

## 2025-01-24 ENCOUNTER — HOSPITAL ENCOUNTER (OUTPATIENT)
Facility: HOSPITAL | Age: 45
Discharge: HOME | End: 2025-01-24
Attending: SURGERY | Admitting: SURGERY
Payer: COMMERCIAL

## 2025-01-24 ENCOUNTER — ANESTHESIA (OUTPATIENT)
Dept: SURGERY | Facility: HOSPITAL | Age: 45
End: 2025-01-24
Payer: COMMERCIAL

## 2025-01-24 DIAGNOSIS — R07.89 CHEST TIGHTNESS: ICD-10-CM

## 2025-01-24 DIAGNOSIS — J01.00 ACUTE MAXILLARY SINUSITIS, RECURRENCE NOT SPECIFIED: ICD-10-CM

## 2025-01-24 DIAGNOSIS — N64.89 COMPLEX SCLEROSING LESION OF LEFT BREAST: Primary | ICD-10-CM

## 2025-01-24 DIAGNOSIS — Z56.6 STRESS AT WORK: ICD-10-CM

## 2025-01-24 DIAGNOSIS — Z01.818 PRE-OP TESTING: ICD-10-CM

## 2025-01-24 LAB
B-HCG UR QL: NEGATIVE
CTP QC/QA: YES

## 2025-01-24 PROCEDURE — 63600175 PHARM REV CODE 636 W HCPCS: Performed by: NURSE ANESTHETIST, CERTIFIED REGISTERED

## 2025-01-24 PROCEDURE — 63600175 PHARM REV CODE 636 W HCPCS: Performed by: SURGERY

## 2025-01-24 PROCEDURE — 71000016 HC POSTOP RECOV ADDL HR: Performed by: SURGERY

## 2025-01-24 PROCEDURE — 81025 URINE PREGNANCY TEST: CPT | Performed by: SURGERY

## 2025-01-24 PROCEDURE — 25000003 PHARM REV CODE 250: Performed by: ANESTHESIOLOGY

## 2025-01-24 PROCEDURE — 36000707: Performed by: SURGERY

## 2025-01-24 PROCEDURE — 27000221 HC OXYGEN, UP TO 24 HOURS

## 2025-01-24 PROCEDURE — 71000033 HC RECOVERY, INTIAL HOUR: Performed by: SURGERY

## 2025-01-24 PROCEDURE — 37000008 HC ANESTHESIA 1ST 15 MINUTES: Performed by: SURGERY

## 2025-01-24 PROCEDURE — 37000009 HC ANESTHESIA EA ADD 15 MINS: Performed by: SURGERY

## 2025-01-24 PROCEDURE — 25000003 PHARM REV CODE 250: Performed by: NURSE ANESTHETIST, CERTIFIED REGISTERED

## 2025-01-24 PROCEDURE — 19125 EXCISION BREAST LESION: CPT | Mod: LT,,, | Performed by: SURGERY

## 2025-01-24 PROCEDURE — 71000039 HC RECOVERY, EACH ADD'L HOUR: Performed by: SURGERY

## 2025-01-24 PROCEDURE — 36000706: Performed by: SURGERY

## 2025-01-24 PROCEDURE — 63600175 PHARM REV CODE 636 W HCPCS: Performed by: ANESTHESIOLOGY

## 2025-01-24 PROCEDURE — 71000015 HC POSTOP RECOV 1ST HR: Performed by: SURGERY

## 2025-01-24 RX ORDER — CEFAZOLIN SODIUM 2 G/50ML
2 SOLUTION INTRAVENOUS
Status: DISCONTINUED | OUTPATIENT
Start: 2025-01-24 | End: 2025-01-24 | Stop reason: HOSPADM

## 2025-01-24 RX ORDER — HYDROMORPHONE HYDROCHLORIDE 2 MG/ML
0.2 INJECTION, SOLUTION INTRAMUSCULAR; INTRAVENOUS; SUBCUTANEOUS EVERY 5 MIN PRN
Status: DISCONTINUED | OUTPATIENT
Start: 2025-01-24 | End: 2025-01-24 | Stop reason: HOSPADM

## 2025-01-24 RX ORDER — ONDANSETRON HYDROCHLORIDE 2 MG/ML
4 INJECTION, SOLUTION INTRAVENOUS EVERY 12 HOURS PRN
Status: DISCONTINUED | OUTPATIENT
Start: 2025-01-24 | End: 2025-01-24 | Stop reason: HOSPADM

## 2025-01-24 RX ORDER — ONDANSETRON HYDROCHLORIDE 2 MG/ML
INJECTION, SOLUTION INTRAMUSCULAR; INTRAVENOUS
Status: DISCONTINUED | OUTPATIENT
Start: 2025-01-24 | End: 2025-01-24

## 2025-01-24 RX ORDER — PROPOFOL 10 MG/ML
VIAL (ML) INTRAVENOUS
Status: DISCONTINUED | OUTPATIENT
Start: 2025-01-24 | End: 2025-01-24

## 2025-01-24 RX ORDER — PROCHLORPERAZINE EDISYLATE 5 MG/ML
5 INJECTION INTRAMUSCULAR; INTRAVENOUS ONCE
Status: DISCONTINUED | OUTPATIENT
Start: 2025-01-24 | End: 2025-01-24

## 2025-01-24 RX ORDER — MIDAZOLAM HYDROCHLORIDE 1 MG/ML
INJECTION INTRAMUSCULAR; INTRAVENOUS
Status: DISCONTINUED | OUTPATIENT
Start: 2025-01-24 | End: 2025-01-24

## 2025-01-24 RX ORDER — HYDROMORPHONE HYDROCHLORIDE 2 MG/ML
INJECTION, SOLUTION INTRAMUSCULAR; INTRAVENOUS; SUBCUTANEOUS
Status: DISCONTINUED | OUTPATIENT
Start: 2025-01-24 | End: 2025-01-24

## 2025-01-24 RX ORDER — GLUCAGON 1 MG
1 KIT INJECTION
Status: DISCONTINUED | OUTPATIENT
Start: 2025-01-24 | End: 2025-01-24 | Stop reason: HOSPADM

## 2025-01-24 RX ORDER — SODIUM CHLORIDE, SODIUM LACTATE, POTASSIUM CHLORIDE, CALCIUM CHLORIDE 600; 310; 30; 20 MG/100ML; MG/100ML; MG/100ML; MG/100ML
INJECTION, SOLUTION INTRAVENOUS CONTINUOUS
Status: DISCONTINUED | OUTPATIENT
Start: 2025-01-24 | End: 2025-01-24 | Stop reason: HOSPADM

## 2025-01-24 RX ORDER — GABAPENTIN 300 MG/1
600 CAPSULE ORAL ONCE
Status: COMPLETED | OUTPATIENT
Start: 2025-01-24 | End: 2025-01-24

## 2025-01-24 RX ORDER — ONDANSETRON HYDROCHLORIDE 2 MG/ML
4 INJECTION, SOLUTION INTRAVENOUS DAILY PRN
Status: DISCONTINUED | OUTPATIENT
Start: 2025-01-24 | End: 2025-01-24 | Stop reason: HOSPADM

## 2025-01-24 RX ORDER — HALOPERIDOL 5 MG/ML
0.5 INJECTION INTRAMUSCULAR EVERY 10 MIN PRN
Status: DISCONTINUED | OUTPATIENT
Start: 2025-01-24 | End: 2025-01-24 | Stop reason: HOSPADM

## 2025-01-24 RX ORDER — OXYCODONE HYDROCHLORIDE 5 MG/1
5 TABLET ORAL
Status: DISCONTINUED | OUTPATIENT
Start: 2025-01-24 | End: 2025-01-24 | Stop reason: HOSPADM

## 2025-01-24 RX ORDER — PHENYLEPHRINE HYDROCHLORIDE 10 MG/ML
INJECTION INTRAVENOUS
Status: DISCONTINUED | OUTPATIENT
Start: 2025-01-24 | End: 2025-01-24

## 2025-01-24 RX ORDER — ACETAMINOPHEN 10 MG/ML
1000 INJECTION, SOLUTION INTRAVENOUS ONCE
Status: COMPLETED | OUTPATIENT
Start: 2025-01-24 | End: 2025-01-24

## 2025-01-24 RX ORDER — TECHNETIUM TC 99M SULFUR COLLOID 2 MG
1 KIT MISCELLANEOUS
Status: DISCONTINUED | OUTPATIENT
Start: 2025-01-24 | End: 2025-01-24 | Stop reason: HOSPADM

## 2025-01-24 RX ORDER — DEXAMETHASONE SODIUM PHOSPHATE 4 MG/ML
INJECTION, SOLUTION INTRA-ARTICULAR; INTRALESIONAL; INTRAMUSCULAR; INTRAVENOUS; SOFT TISSUE
Status: DISCONTINUED | OUTPATIENT
Start: 2025-01-24 | End: 2025-01-24

## 2025-01-24 RX ORDER — DIPHENHYDRAMINE HYDROCHLORIDE 50 MG/ML
25 INJECTION INTRAMUSCULAR; INTRAVENOUS EVERY 6 HOURS PRN
Status: DISCONTINUED | OUTPATIENT
Start: 2025-01-24 | End: 2025-01-24 | Stop reason: HOSPADM

## 2025-01-24 RX ORDER — FENTANYL CITRATE 50 UG/ML
INJECTION, SOLUTION INTRAMUSCULAR; INTRAVENOUS
Status: DISCONTINUED | OUTPATIENT
Start: 2025-01-24 | End: 2025-01-24

## 2025-01-24 RX ORDER — TRAMADOL HYDROCHLORIDE 50 MG/1
50 TABLET ORAL EVERY 6 HOURS
Qty: 12 TABLET | Refills: 0 | Status: SHIPPED | OUTPATIENT
Start: 2025-01-24 | End: 2025-01-27

## 2025-01-24 RX ORDER — BUPIVACAINE HYDROCHLORIDE 5 MG/ML
INJECTION, SOLUTION EPIDURAL; INTRACAUDAL
Status: DISCONTINUED | OUTPATIENT
Start: 2025-01-24 | End: 2025-01-24 | Stop reason: HOSPADM

## 2025-01-24 RX ORDER — LIDOCAINE HYDROCHLORIDE 10 MG/ML
INJECTION, SOLUTION EPIDURAL; INFILTRATION; INTRACAUDAL; PERINEURAL
Status: DISCONTINUED | OUTPATIENT
Start: 2025-01-24 | End: 2025-01-24

## 2025-01-24 RX ORDER — ROCURONIUM BROMIDE 10 MG/ML
INJECTION, SOLUTION INTRAVENOUS
Status: DISCONTINUED | OUTPATIENT
Start: 2025-01-24 | End: 2025-01-24

## 2025-01-24 RX ORDER — TRAMADOL HYDROCHLORIDE 50 MG/1
50 TABLET ORAL EVERY 4 HOURS PRN
Status: DISCONTINUED | OUTPATIENT
Start: 2025-01-24 | End: 2025-01-24 | Stop reason: HOSPADM

## 2025-01-24 RX ORDER — EPHEDRINE SULFATE 50 MG/ML
INJECTION, SOLUTION INTRAVENOUS
Status: DISCONTINUED | OUTPATIENT
Start: 2025-01-24 | End: 2025-01-24

## 2025-01-24 RX ORDER — PROCHLORPERAZINE EDISYLATE 5 MG/ML
5 INJECTION INTRAMUSCULAR; INTRAVENOUS ONCE
Status: COMPLETED | OUTPATIENT
Start: 2025-01-24 | End: 2025-01-24

## 2025-01-24 RX ORDER — CEFAZOLIN SODIUM 1 G/3ML
INJECTION, POWDER, FOR SOLUTION INTRAMUSCULAR; INTRAVENOUS
Status: DISCONTINUED | OUTPATIENT
Start: 2025-01-24 | End: 2025-01-24 | Stop reason: HOSPADM

## 2025-01-24 RX ORDER — KETOROLAC TROMETHAMINE 30 MG/ML
15 INJECTION, SOLUTION INTRAMUSCULAR; INTRAVENOUS ONCE
Status: COMPLETED | OUTPATIENT
Start: 2025-01-24 | End: 2025-01-24

## 2025-01-24 RX ADMIN — MIDAZOLAM HYDROCHLORIDE 2 MG: 1 INJECTION, SOLUTION INTRAMUSCULAR; INTRAVENOUS at 07:01

## 2025-01-24 RX ADMIN — ONDANSETRON 4 MG: 2 INJECTION INTRAMUSCULAR; INTRAVENOUS at 12:01

## 2025-01-24 RX ADMIN — GABAPENTIN 600 MG: 300 CAPSULE ORAL at 05:01

## 2025-01-24 RX ADMIN — PHENYLEPHRINE HYDROCHLORIDE 100 MCG: 10 INJECTION INTRAVENOUS at 09:01

## 2025-01-24 RX ADMIN — SODIUM CHLORIDE, SODIUM GLUCONATE, SODIUM ACETATE, POTASSIUM CHLORIDE AND MAGNESIUM CHLORIDE: 526; 502; 368; 37; 30 INJECTION, SOLUTION INTRAVENOUS at 07:01

## 2025-01-24 RX ADMIN — ONDANSETRON 4 MG: 2 INJECTION INTRAMUSCULAR; INTRAVENOUS at 07:01

## 2025-01-24 RX ADMIN — EPHEDRINE SULFATE 10 MG: 50 INJECTION INTRAVENOUS at 08:01

## 2025-01-24 RX ADMIN — PROPOFOL 200 MG: 10 INJECTION, EMULSION INTRAVENOUS at 07:01

## 2025-01-24 RX ADMIN — DEXAMETHASONE SODIUM PHOSPHATE 8 MG: 4 INJECTION, SOLUTION INTRA-ARTICULAR; INTRALESIONAL; INTRAMUSCULAR; INTRAVENOUS; SOFT TISSUE at 07:01

## 2025-01-24 RX ADMIN — DEXTROSE MONOHYDRATE 2 G: 50 INJECTION, SOLUTION INTRAVENOUS at 07:01

## 2025-01-24 RX ADMIN — EPHEDRINE SULFATE 20 MG: 50 INJECTION INTRAVENOUS at 08:01

## 2025-01-24 RX ADMIN — LIDOCAINE HYDROCHLORIDE 40 MG: 10 INJECTION, SOLUTION EPIDURAL; INFILTRATION; INTRACAUDAL; PERINEURAL at 09:01

## 2025-01-24 RX ADMIN — LIDOCAINE HYDROCHLORIDE 40 MG: 10 INJECTION, SOLUTION EPIDURAL; INFILTRATION; INTRACAUDAL; PERINEURAL at 07:01

## 2025-01-24 RX ADMIN — PROCHLORPERAZINE EDISYLATE 5 MG: 5 INJECTION INTRAMUSCULAR; INTRAVENOUS at 02:01

## 2025-01-24 RX ADMIN — EPHEDRINE SULFATE 10 MG: 50 INJECTION INTRAVENOUS at 09:01

## 2025-01-24 RX ADMIN — SODIUM CHLORIDE, SODIUM GLUCONATE, SODIUM ACETATE, POTASSIUM CHLORIDE AND MAGNESIUM CHLORIDE: 526; 502; 368; 37; 30 INJECTION, SOLUTION INTRAVENOUS at 08:01

## 2025-01-24 RX ADMIN — ROCURONIUM BROMIDE 10 MG: 10 SOLUTION INTRAVENOUS at 08:01

## 2025-01-24 RX ADMIN — SUGAMMADEX 200 MG: 100 INJECTION, SOLUTION INTRAVENOUS at 09:01

## 2025-01-24 RX ADMIN — PHENYLEPHRINE HYDROCHLORIDE 100 MCG: 10 INJECTION INTRAVENOUS at 08:01

## 2025-01-24 RX ADMIN — HYDROMORPHONE HYDROCHLORIDE 1 MG: 2 INJECTION, SOLUTION INTRAMUSCULAR; INTRAVENOUS; SUBCUTANEOUS at 08:01

## 2025-01-24 RX ADMIN — FENTANYL CITRATE 100 MCG: 50 INJECTION, SOLUTION INTRAMUSCULAR; INTRAVENOUS at 07:01

## 2025-01-24 RX ADMIN — ACETAMINOPHEN 1000 MG: 10 INJECTION, SOLUTION INTRAVENOUS at 05:01

## 2025-01-24 RX ADMIN — KETOROLAC TROMETHAMINE 15 MG: 30 INJECTION, SOLUTION INTRAMUSCULAR at 01:01

## 2025-01-24 SDOH — SOCIAL DETERMINANTS OF HEALTH (SDOH): OTHER PHYSICAL AND MENTAL STRAIN RELATED TO WORK: Z56.6

## 2025-01-24 NOTE — INTERVAL H&P NOTE
The patient has been examined and the H&P has been reviewed:    I concur with the findings and no changes have occurred since H&P was written.    Surgery risks, benefits and alternative options discussed and understood by patient/family.      All of questions were answered    Elaine Fong MD  Breast Surgical Oncology

## 2025-01-24 NOTE — TRANSFER OF CARE
"Anesthesia Transfer of Care Note    Patient: Doreen Dorman    Procedure(s) Performed: Procedure(s) (LRB):  EXCISION,MASS,BREAST,USING RADIOLOGICAL MARKER // Left (Left)    Patient location: PACU    Anesthesia Type: general    Transport from OR: Transported from OR on room air with adequate spontaneous ventilation    Post pain: adequate analgesia    Post assessment: no apparent anesthetic complications    Post vital signs: stable    Level of consciousness: responds to stimulation    Nausea/Vomiting: no nausea/vomiting    Complications: none    Transfer of care protocol was followed      Last vitals: Visit Vitals  /73   Pulse 82   Temp 36.8 °C (98.2 °F) (Oral)   Resp 17   Ht 5' 4" (1.626 m)   Wt 79.4 kg (175 lb 0.7 oz)   SpO2 99%   Breastfeeding No   BMI 30.05 kg/m²     "

## 2025-01-24 NOTE — ANESTHESIA POSTPROCEDURE EVALUATION
Anesthesia Post Evaluation    Patient: Doreen Dorman    Procedure(s) Performed: Procedure(s) (LRB):  EXCISION,MASS,BREAST,USING RADIOLOGICAL MARKER // Left (Left)    Final Anesthesia Type: general      Patient location during evaluation: PACU  Patient participation: Yes- Able to Participate  Level of consciousness: awake  Post-procedure vital signs: reviewed and stable  Pain management: adequate  Airway patency: patent  ADITI mitigation strategies: Multimodal analgesia  PONV status at discharge: No PONV  Anesthetic complications: no      Cardiovascular status: hemodynamically stable  Respiratory status: spontaneous ventilation  Hydration status: euvolemic  Follow-up not needed.              Vitals Value Taken Time   /80 01/24/25 1041   Temp 36 °C (96.8 °F) 01/24/25 1008   Pulse 88 01/24/25 1043   Resp 13 01/24/25 1043   SpO2 94 % 01/24/25 1043   Vitals shown include unfiled device data.      No case tracking events are documented in the log.      Pain/Claudia Score: Pain Rating Prior to Med Admin: 0 (1/24/2025  5:56 AM)  Claudia Score: 8 (1/24/2025 10:24 AM)

## 2025-01-24 NOTE — H&P
[facility] Breast Surg  Breast Surgical Oncology        Referring Provider: Dr. Elaine Fong  PCP: Thelma Dougherty MD   Care Team:    Chief Complaint:   Complex Sclerosing Lesion Left Breast     Subjective:     Interval History:  01/24/2025 - Doreen Dorman presents today for left breast excisional biopsy. No significant interval changes.      HPI:  Doreen Dorman is a 44 y.o. female who presents on 8/27/2024 for evaluation of left breast masses, which were initially found on screening mammogram in May 2023 and were recommended for close monitoring with diagnostic imaging. She returned for mammogram/US in June 2023 now with a palpable concern in the left and one of the masses was noted to have increased in size. This was biopsied, and results showed benign fat necrosis. Close interval follow up imaging of this biopsied mass as well as other areas of concern have since shown stability for 1 year. She is currently recommended for a follow up mammogram scheduled in May 2024 to complete 2 years of follow up since the masses were first found.      Since the biopsy in June 2023, she has remained asymptomatic and feels that the fat necrosis lump has gradually shrunk in size. However, about 1 month ago, she felt a soft lump in the 10:00 axis of the left breast about 4 cmfn. She noticed it around the time of menstruation and it has since disappeared. She has not felt any lumps since this visit. She currently denies any breast issues including rashes, redness, pain, swelling, nipple discharge, or new lumps/masses. She does have a history of fibrocystic breasts and occasionally has breast pains. She has regular menstrual cycles s/t progesterone supplementation to induce ovulation, which she takes as an alternative to birth control.    She does report a family history of breast cancer including diagnosis in her half sister. Her lifetime risk was assessed and found to  be elevated at 33.4%.    Imagin2023 BL DG MG and BL Breast US Complete - BIRADS 3:   1. Oval, mixed echogenicity mass with indistinct margins seen sonographically in the 11:00 left breast, 6 cm from the nipple, without a mammographic correlate is probably benign.  This is favored to represent fat necrosis in the setting of trauma in this region.  A follow-up targeted left breast ultrasound and possible left diagnostic mammogram in 3 months is recommended.  2. Oval, anechoic mass with circumscribed margins measuring up to 22 mm in the 10:00 left breast, 3 cm from the nipple, is probably benign.  This is favored to represent a cyst cluster.  Attention at time of follow-up is recommended.  3. Oval, hypoechoic mass with circumscribed margins measuring up to 9 mm in the 4:00 left breast, 4 cm from the nipple, is probably benign.  It is favored to represent a complicated cyst versus a fibroadenoma.  Attention at time of follow-up is recommended.  2023 L DG MG and L breast US - BIRADS 4:   1. Oval, mixed echogenicity mass with indistinct margins measuring up to 12 mm in the 11:00 left breast, 6 cm from the nipple, is now considered suspicious given that it is now palpable and given the interval increase in size.  Left breast ultrasound-guided core needle biopsy is recommended. (Results below - benign and a follow-up left diagnostic mammogram and targeted left breast ultrasound is recommended in 6 months to demonstrate stability of the additional masses assessed as probably benign)  2. Oval, anechoic mass with circumscribed margins measuring up to 20 mm in the 10:00 left breast, 3 cm from the nipple, is not significantly changed compared to the 2023 examination and thus remains probably benign.  Final recommendations regarding this mass will be made once the above biopsy results become available.  3. Oval, hypoechoic mass with circumscribed margins measuring up to 9 mm in the 4:00 left breast, 4 cm from  the nipple, is not significantly changed compared to the 05/23/2023 examination and thus remains probably benign.  Final recommendations regarding this mass will be made once the above biopsy results become available.    1/17/2024 L DG MG and L Breast US complete - BIRADS 3:  1.  Left breast 10:00 3 cm from the nipple posterior depth grouping of avascular anechoic masses with circumscribed margins, together spanning approximately 2.6 cm, is not significantly changed compared to previous exams dating back to 5/23/2023 (8 months ago) and remains probably benign.  This is again favored to represent a cyst cluster.  2.  Left breast 4:00 4 cm from the nipple 1.0 cm avascular mixed echogenicity mass with circumscribed margins is not significantly changed compared to previous exams dating back to 5/23/2023 (8 months ago) and remains probably benign.  This is again most likely a complicated cyst versus a fibroadenoma.  3.  Multiple additional benign cysts of varying size and complication in the left breast, several of which are new or increased in size compared to previous exams.  The largest of these benign cysts in the 3:00 left breast 4 cm from the nipple measures 1.7 cm.  4.  Prior benign core needle biopsy of the 11:00 left breast 6 cm from the nipple on 7/17/2023 (organizing fat necrosis), with no detrimental interval change at the biopsy site.  RECOMMENDATIONS: Bilateral diagnostic mammography with tomosynthesis, followed by targeted left breast ultrasound, will be due in 4 months as part of the two year follow-up process for probably benign findings (May 2024).    5/21/2024 BL DG MG and L breast US - BIRADS 3:   1) Left breast 10:00 axis 3 cm FN 1.7 x 0.7 x 2.7 cm grouping of oval parallel hypoechoic circumscribed avascular masses is not significantly changed compared to prior mammograms and ultrasounds dating back to 05/23/2023 (1 year ago) and remains probably benign.  BI-RADS 3: Probably benign.    2) Left breast  4:00 axis 4 cm FN 0.8 x 0.5 x 0.9 cm oval parallel hypoechoic/anechoic circumscribed avascular mass is stable compared to prior ultrasounds dating back to 2023 and remains probably benign.  Favor this to represent a complicated cyst.  BI-RADS 3: Probably benign.    3) No mammographic evidence of malignancy in the right breast.  4) Cystic mastopathy of the left breast is benign.  BI-RADS 2: Benign.  RECOMMENDATIONS: Recommend continued follow-up of the left breast 10:00 axis 3 cm FN and left breast 4:00 axis 4 cm FN probably benign findings according to the standard BI-RADS 3 protocol.  Next exam due is bilateral diagnostic mammogram and limited left breast ultrasound in May, 2025.  That exam will represent 2 years of follow-up from the time of initial probably benign assessment.    Pathology:   2023 LEFT BREAST 11 O'CLOCK 6 CMFN AREA OF PALP:   ORGANIZING FAT NECROSIS. NO EVIDENCE OF MALIGNANCY.     OB/GYN History:  Age at Menarche Onset: 12  Menopausal Status: premenopausal, LMP: No LMP recorded.  Hysterectomy/Oophorectomy: no, neither  Hormonal birth control (duration): none  Pregnancy History:   Age at first live birth: n/a  Hormone Replacement Therapy: No, none    Other:  MG breast density: BIRADS C  Prior thoracic RT: none  Genetic testing: none  Ashkenazi Moravian descent: No    Family History:  Family History   Problem Relation Name Age of Onset    Arthritis Mother Charline Dorman     No Known Problems Father      Breast cancer Other Merline Salo 45        great aunt    Breast cancer Half-sister Rachelle Koch         paternal        Patient History:  Past Medical History:   Diagnosis Date    Anemia     Anxiety     Hyperthyroidism        Active Problem List with Overview Notes    Diagnosis Date Noted    Complex sclerosing lesion of left breast 2024    Fibrocystic breast changes 2024    Fat necrosis of left breast 2024    COVID-19 2024    Chest tightness 2024     Acute maxillary sinusitis 12/09/2024    Vaginal candida 12/09/2024    Viral URI with cough 11/27/2024    Abdominal lump 08/22/2024    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    Umbilical hernia without obstruction and without gangrene 08/22/2024    Urticaria 08/07/2024    Hyperthyroidism     Overweight (BMI 25.0-29.9) 04/17/2024    Hordeolum internum of right upper eyelid 04/17/2024    Iron deficiency anemia 05/12/2023    Low platelet count 05/12/2023        Past Surgical History:   Procedure Laterality Date    CERVICAL SPINE SURGERY N/A 08/11/2022    FRACTURE SURGERY  8/11/2022    NASAL SEPTOPLASTY      SINUS SURGERY  09/2024    SPINE SURGERY  8/11/2022       Social History     Socioeconomic History    Marital status: Single   Tobacco Use    Smoking status: Never     Passive exposure: Never    Smokeless tobacco: Never   Substance and Sexual Activity    Alcohol use: Never    Drug use: Never    Sexual activity: Yes     Partners: Male     Birth control/protection: None     Social Drivers of Health     Financial Resource Strain: Medium Risk (2/21/2024)    Overall Financial Resource Strain (CARDIA)     Difficulty of Paying Living Expenses: Somewhat hard   Food Insecurity: Food Insecurity Present (2/21/2024)    Hunger Vital Sign     Worried About Running Out of Food in the Last Year: Sometimes true     Ran Out of Food in the Last Year: Sometimes true   Transportation Needs: No Transportation Needs (2/21/2024)    PRAPARE - Transportation     Lack of Transportation (Medical): No     Lack of Transportation (Non-Medical): No   Physical Activity: Sufficiently Active (2/21/2024)    Exercise Vital Sign     Days of Exercise per Week: 4 days     Minutes of Exercise per Session: 60 min   Stress: Stress Concern Present (2/21/2024)    [employer] [place] [employer] - Occupational Stress Questionnaire     Feeling of Stress : Very much   Housing Stability: Low Risk  (2/21/2024)    Housing Stability Vital Sign     Unable to Pay for  "Housing in the Last Year: No     Number of Places Lived in the Last Year: 1     Unstable Housing in the Last Year: No       Immunization History   Administered Date(s) Administered    COVID-19 MRNA, LN-S PF (MODERNA HALF 0.25 ML DOSE) 12/23/2021    COVID-19, MRNA, LN-S, PF (MODERNA FULL 0.5 ML DOSE) 03/05/2021, 04/05/2021    DTaP 1982, 1982, 02/25/1983, 11/08/1984, 08/27/1987    MMR 11/08/1984    OPV 1982, 1982, 02/25/1983, 11/08/1984, 08/27/1987    PPD Test 04/06/2000    Tdap 05/14/2021       Medications/Allergies:    Current Facility-Administered Medications:     cefazolin (ANCEF) 2 gram in dextrose 5% 50 mL IVPB (premix), 2 g, Intravenous, On Call Procedure, Sharon Ybarra PA    lactated ringers infusion, , Intravenous, Continuous, Sharon Ybarra PA     Review of patient's allergies indicates:  No Known Allergies    Review of Systems:  All pertinent history in [facility].      Objective:     Vitals:  Vitals:    01/23/25 1653 01/24/25 0534   BP:  107/73   Pulse:  82   Resp:  17   Temp:  98.2 °F (36.8 °C)   TempSrc:  Oral   SpO2:  99%   Weight: 78.5 kg (173 lb) 79.4 kg (175 lb 0.7 oz)   Height: 5' 4" (1.626 m) 5' 4" (1.626 m)         Body mass index is 30.05 kg/m².     Physical Exam:  General: The patient is awake, alert and oriented times three. The patient is well nourished and in no acute distress.  Neck: There is no evidence of palpable cervical, supraclavicular or axillary adenopathy. The neck is supple. The thyroid is not enlarged.  Musculoskeletal: The patient has a normal range of motion of her bilateral upper extremities.  Chest: Examination of the chest wall fails to reveal any obvious abnormalities.  The lungs are clear to auscultation bilaterally without rales, rhonchi, or wheezing.  Cardiovascular: The heart has a regular rate and rhythm without murmurs, gallops or rubs.  Breast:   Deferred today.   Abdomen: The abdomen is soft, flat, nontender and nondistended with no " palpable masses or organomegaly.  Integumentary: no rashes or skin lesions present  Neurologic: cranial nerves intact, no signs of peripheral neurological deficit, motor/sensory function intact    Assessment:     Patient Active Problem List   Diagnosis    Iron deficiency anemia    Low platelet count    Overweight (BMI 25.0-29.9)    Hordeolum internum of right upper eyelid    Urticaria    Hyperthyroidism    Abdominal lump    Accessory spleen    Umbilical hernia without obstruction and without gangrene    Viral URI with cough    COVID-19    Chest tightness    Acute maxillary sinusitis    Vaginal candida    Complex sclerosing lesion of left breast    Fibrocystic breast changes    Fat necrosis of left breast        Crystal was seen today for follow-up.    Diagnoses and all orders for this visit:    Complex sclerosing lesion of left breast  -     Ambulatory referral/consult to Breast Surgery  -     Place in Outpatient; Standing  -     Case Request Operating Room: EXCISION,MASS,BREAST,USING RADIOLOGICAL MARKER  -     Vital signs; Standing  -     Insert peripheral IV; Standing  -     Height and weight; Standing  -     Intake and output; Standing  -     Verify discontinuation of antithrombotics; Standing  -     Verify consent; Standing  -     Chlorhexidine (CHG) 2% Wipes; Standing  -     Notify Physician; Standing  -     Diet NPO; Standing  -     lactated ringers infusion  -     IP VTE LOW RISK PATIENT; Standing  -     cefazolin (ANCEF) 2 gram in dextrose 5% 50 mL IVPB (premix)  -     Basic metabolic panel; Future  -     CBC auto differential; Future  -     Pulse Oximetry Q4H; Standing  -     Full code; Standing  -     Insert peripheral IV    Fibrocystic breast changes, unspecified laterality    Fat necrosis of left breast    Pre-op testing  -     Basic metabolic panel; Future  -     CBC auto differential; Future  -     Pregnancy, urine rapid; Standing    Radial scars, also called complex sclerosing lesions, are a  "pathologic diagnosis, usually discovered incidentally when a breast mass or radiologic abnormality is removed or biopsied. Occasionally, radial scars are large enough to be detected on mammography as suspicious spiculated masses, which cannot be reliably differentiated from spiculated carcinoma by imaging alone. Radial scars are characterized microscopically by a fibroelastic core with radiating ducts and lobules.    In general, surgical excision is recommended when radial scars or complex sclerosing lesions are diagnosed on core biopsy, based on series showing that 8 to 17 percent of surgical specimens at subsequent excision are positive for malignancy. The current recommendations from the American Society of Breast Surgeons state that most radial scars "should be excised, although imaging follow-up is reasonable for small, image-detected radial scars that are completely removed or well-sampled with large-gauge devices and in the setting of imaging-pathology concordance".  No additional treatment beyond excision is needed for radial scars. The risk of subsequent breast cancer after excision in this population is small, and chemoprevention is not indicated.     Regardless of whether the patient undergoes surgical excisional biopsy of the left breast 10:00 complex sclerosing lesion, she is still recommended for a follow-up bilateral dynamic contrast-enhanced breast MRI is recommended in 6 months to ensure stability of additional morphologically similar areas of heterogeneous non-mass enhancement in both breasts. As the findings are more conspicuous/reproducible on breast MRI compared to mammogram and ultrasound, the recommended breast MRI can be performed in lieu of the diagnostic mammogram and ultrasound to complete the 2-year follow-up process.       Plan:      After discussion, patient states she wants to proceed with surgical excision (with seed localization). Will tentatively schedule for 1/24/2025.     Patient " would also like to return for follow up to discuss plan with Dr. Fong prior to surgery for consultation. This was scheduled for 1/21 when she will also have same day seed-placement.    Follow-up bilateral dynamic contrast-enhanced breast MRI is recommended in 6 months to ensure stability of additional morphologically similar areas of heterogeneous non-mass enhancement in both breasts. This has already been scheduled.    All of her questions were answered. She was advised to call if she develops any questions or concerns.        Yoanna Kirk PA-C   Breast Surgical Oncology

## 2025-01-24 NOTE — BRIEF OP NOTE
[facility]  Brief Operative Note     SUMMARY     Surgery Date: 1/24/2025     Surgeon: Elaine Fong MD    Assist: Yoanna Kirk PA-C    Pre-op Diagnosis:  Complex sclerosing lesion of left breast [N64.89]    Post-op Diagnosis:  Post-Op Diagnosis Codes:     * Complex sclerosing lesion of left breast [N64.89]    Procedure(s) (LRB):  EXCISION,MASS,BREAST,USING RADIOLOGICAL MARKER // Left (Left)    Anesthesia: General    Findings/Key Components: Removal of left breast tissue for pathology revaluation    Estimated Blood Loss: 7 ml         Specimens:   Specimen (24h ago, onward)       Start     Ordered    01/24/25 0912  Specimen to Pathology  RELEASE UPON ORDERING        References:    Click here for ordering Quick Tip   Question:  Release to patient  Answer:  Immediate    01/24/25 0912                  ID Type Source Tests Collected by Time Destination   A : L Breast Excisional Biopsy Tissue Breast, Left SPECIMEN TO PATHOLOGY Elaine oFng MD 1/24/2025 0911        Discharge Note    SUMMARY     Admit Date: 1/24/2025    Discharge Date and Time:  01/24/2025 9:47 AM    Hospital Course (synopsis of major diagnoses, care, treatment, and services provided during the course of the hospital stay): status post left breast excisional biopsy     Final Diagnosis: Post-Op Diagnosis Codes:     * Complex sclerosing lesion of left breast [N64.89]    Disposition: Home or Self Care    Follow Up/Patient Instructions:     Medications:    Reconciled Home Medications:      Medication List        ASK your doctor about these medications      CLARITIN 10 mg tablet  Generic drug: loratadine  10 mg.     clindamycin-benzoyl peroxide gel  Apply 45 g topically once daily.     cyclobenzaprine 10 MG tablet  Commonly known as: FLEXERIL  Take 10 mg by mouth 2 (two) times daily as needed.     ergocalciferol 50,000 unit Cap  Commonly known as: ERGOCALCIFEROL  TAKE 1 CAPSULE BY MOUTH EVERY 7 DAYS     ferrous gluconate 324 mg  (37.5 mg iron) Tab tablet  324 mg.     FIORICET -40 mg Cap  Generic drug: butalbital-acetaminophen-caff  1 capsule as needed Orally every 4 hrs     fluticasone propionate 50 mcg/actuation nasal spray  Commonly known as: FLONASE  SPRAY TWICE IN EACH NOSTRIL ONCE DAILY     levocetirizine 5 MG tablet  Commonly known as: XYZAL  TAKE 1 TABLET(5 MG) BY MOUTH EVERY EVENING     methIMAzole 5 MG Tab  Commonly known as: TAPAZOLE  5 mg.            No discharge procedures on file.

## 2025-01-24 NOTE — OP NOTE
DATE OF PROCEDURE: 1/24/2025    SURGEON: Elaine Fong M.D.    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 Left breast excisional biopsy with MagSeed localization     EXCISION,MASS,BREAST,USING RADIOLOGICAL MARKER // Left: 00720 (CPT®)       PROCEDURE IN DETAIL:   Doreen Dorman is a 44 y.o. female brought to the operating room for definitive surgical management of recent core biopsy revealing left breast complex sclerosing lesion. The patient has elected to undergo excisional biopsy for further evaluation. The patient was informed of the possible risks and complications of the procedure, including but not limited to anesthetic risks, bleeding, infection, and need for additional surgery. The patient concurred with the proposed plan, and has given informed consent. The site of surgery was properly noted/marked in the preoperative holding area.    The patient underwent informed consent. Two MagSeeds were placed in the upper inner quadrant of the left breast. The MagSeed localization films were reviewed.    She was then brought to the Operating Room and placed in the supine position. Anesthesia was administered. The left breast, anterior chest, arm and axilla were then prepped and draped in a sterile fashion.       Left Excisional Biopsy with Seed Localization  After localizing the seed with the SentiMag, a circumareolar incision was planned to allow access to the seeds localized within the upper outer quadrant of the left breast . The incision was made using 15-blade. The incision was deepened through the subcutaneous tissue using electrocautery and hemostasis was maintained. The specimen was dissected  circumferentially around the seed and area of concern. The specimen was completely dissected free. The seed localized specimen was marked with sterile margin charms and submitted for specimen radiograph. Specimen radiograph confirmed the two seeds, clip and area of interest were within the specimen.    Within the lumpectomy cavity, hemostasis was achieved with cautery. The cavity was irrigated until clear. There was no evidence of bleeding. It was closed in multiple layers with deep dermal and subcutaneous interrupted 3-0 Monocryl sutures and a running 4-0 Monocryl subcuticular skin closure. Dermabond was applied followed by sterile dressings.    All instruments and sponge counts were correct at the end of the procedure.     Significant Surgical Tasks Conducted by the Assistant(s), if Applicable: Due to the lack of a qualified senior resident or fellow to assist  in the surgical care of this patient, the skilled assistance of the Physician Assistant, Yoanna Kirk PA-C, were required in order to safely and expediently carry out this surgery. She was essential for proper positioning of the patient, manipulation of instruments, proper exposure, manipulation of tissue, and wound closure.         ESTIMATED BLOOD LOSS: 7 ml    Implants: * No implants in log *    Specimens:   Specimen (24h ago, onward)       Start     Ordered    01/24/25 0912  Specimen to Pathology  RELEASE UPON ORDERING        References:    Click here for ordering Quick Tip   Question:  Release to patient  Answer:  Immediate    01/24/25 0912                   ID Type Source Tests Collected by Time Destination   A : L Breast Excisional Biopsy Tissue Breast, Left SPECIMEN TO PATHOLOGY Elaine Fong MD 1/24/2025 0911                 Condition: Good    Disposition: PACU - hemodynamically stable.    Attestation: I performed the procedure.

## 2025-01-24 NOTE — ANESTHESIA PROCEDURE NOTES
Intubation    Date/Time: 1/24/2025 7:39 AM    Performed by: Yesica Amin CRNA  Authorized by: Jason Lombardi MD    Intubation:     Induction:  Intravenous    Intubated:  Postinduction    Mask Ventilation:  Easy with oral airway    Attempts:  1    Attempted By:  CRNA    Difficult Airway Encountered?: No      Complications:  None    Airway Device:  Supraglottic airway/LMA    Airway Device Size:  4.0    Style/Cuff Inflation:  Cuffed (inflated to minimal occlusive pressure)    Placement Verified By:  Capnometry    Complicating Factors:  None    Findings Post-Intubation:  BS equal bilateral

## 2025-01-27 VITALS
RESPIRATION RATE: 17 BRPM | HEIGHT: 64 IN | WEIGHT: 175.06 LBS | TEMPERATURE: 98 F | OXYGEN SATURATION: 99 % | BODY MASS INDEX: 29.89 KG/M2 | SYSTOLIC BLOOD PRESSURE: 125 MMHG | DIASTOLIC BLOOD PRESSURE: 74 MMHG | HEART RATE: 87 BPM

## 2025-01-27 LAB — PSYCHE PATHOLOGY RESULT: NORMAL

## 2025-01-29 ENCOUNTER — PATIENT MESSAGE (OUTPATIENT)
Dept: FAMILY MEDICINE | Facility: CLINIC | Age: 45
End: 2025-01-29
Payer: COMMERCIAL

## 2025-01-29 DIAGNOSIS — Z56.6 STRESS AT WORK: Primary | ICD-10-CM

## 2025-01-29 SDOH — SOCIAL DETERMINANTS OF HEALTH (SDOH): OTHER PHYSICAL AND MENTAL STRAIN RELATED TO WORK: Z56.6

## 2025-01-30 ENCOUNTER — TELEPHONE (OUTPATIENT)
Dept: SURGERY | Facility: CLINIC | Age: 45
End: 2025-01-30
Payer: COMMERCIAL

## 2025-01-30 NOTE — TELEPHONE ENCOUNTER
Patient called with her pathology results.      ----- Message from Elaine Fong MD sent at 1/30/2025 12:19 PM CST -----  Please call the patient and inform pathology results revealed benign findings and no evidence of cancer. There was some lactational changes noted. Further discussion will be had at their post-op/follow-up appointment.      
no

## 2025-02-11 ENCOUNTER — OFFICE VISIT (OUTPATIENT)
Dept: SURGERY | Facility: CLINIC | Age: 45
End: 2025-02-11
Payer: COMMERCIAL

## 2025-02-11 VITALS
DIASTOLIC BLOOD PRESSURE: 76 MMHG | SYSTOLIC BLOOD PRESSURE: 112 MMHG | BODY MASS INDEX: 30.22 KG/M2 | OXYGEN SATURATION: 97 % | HEART RATE: 79 BPM | TEMPERATURE: 98 F | HEIGHT: 64 IN | WEIGHT: 177 LBS | RESPIRATION RATE: 18 BRPM

## 2025-02-11 DIAGNOSIS — N64.1 FAT NECROSIS OF LEFT BREAST: ICD-10-CM

## 2025-02-11 DIAGNOSIS — Z91.89 AT HIGH RISK FOR BREAST CANCER: ICD-10-CM

## 2025-02-11 DIAGNOSIS — N64.89 COMPLEX SCLEROSING LESION OF LEFT BREAST: Primary | ICD-10-CM

## 2025-02-11 DIAGNOSIS — N60.19 FIBROCYSTIC BREAST CHANGES, UNSPECIFIED LATERALITY: ICD-10-CM

## 2025-02-11 PROCEDURE — 3078F DIAST BP <80 MM HG: CPT | Mod: CPTII,S$GLB,, | Performed by: PHYSICIAN ASSISTANT

## 2025-02-11 PROCEDURE — 3074F SYST BP LT 130 MM HG: CPT | Mod: CPTII,S$GLB,, | Performed by: PHYSICIAN ASSISTANT

## 2025-02-11 PROCEDURE — 1159F MED LIST DOCD IN RCRD: CPT | Mod: CPTII,S$GLB,, | Performed by: PHYSICIAN ASSISTANT

## 2025-02-11 PROCEDURE — 99999 PR PBB SHADOW E&M-EST. PATIENT-LVL IV: CPT | Mod: PBBFAC,,, | Performed by: PHYSICIAN ASSISTANT

## 2025-02-11 PROCEDURE — 1160F RVW MEDS BY RX/DR IN RCRD: CPT | Mod: CPTII,S$GLB,, | Performed by: PHYSICIAN ASSISTANT

## 2025-02-11 PROCEDURE — 99024 POSTOP FOLLOW-UP VISIT: CPT | Mod: S$GLB,,, | Performed by: PHYSICIAN ASSISTANT

## 2025-02-11 NOTE — PROGRESS NOTES
[facility] Breast Surg  Breast Surgical Oncology  Established Patient Office Visit - H&P      Referring Provider: Dr. Thelma Dougherty   PCP: Thelma Dougherty MD   Care Team:    Chief Complaint:   Chief Complaint   Patient presents with    Post-op Evaluation     Patient c/o intermittent left breast pain x 1 week tender to touch mild swelling, mild redness       Subjective:     Treatments:  1/24/2025 L breast excisional biopsy - CSL    Interval History:  02/11/2025 - Doreen Dorman returns today for post-op. She is s/p left breast excisional biopsy on 1/24/2025. Surgical pathology revealed a complex sclerosing lesion. She is doing well post op. Reports incision is healing well but she did have a lot of bruising and intermittent pain/sensitivity since surgery.     HPI:  Doreen Dorman is a 44 y.o. female who presents on 8/27/2024 for evaluation of left breast masses, which were initially found on screening mammogram in May 2023 and were recommended for close monitoring with diagnostic imaging. She returned for mammogram/US in June 2023 now with a palpable concern in the left and one of the masses was noted to have increased in size. This was biopsied, and results showed benign fat necrosis. Close interval follow up imaging of this biopsied mass as well as other areas of concern have since shown stability for 1 year. She is currently recommended for a follow up mammogram scheduled in May 2024 to complete 2 years of follow up since the masses were first found. She does report a family history of breast cancer including diagnosis in her half sister. Her lifetime risk was assessed and found to be elevated at 33.4%. She does have a history of fibrocystic breasts and occasionally has breast pains. She has regular menstrual cycles s/t progesterone supplementation to induce ovulation, which she takes as an alternative to birth control.    She returned 12/18/2024 for  surgical consultation regarding a biopsy-proven high-risk benign complex sclerosing lesion in the 10:00 left breast 3 cm from the nipple. She elected to undergo excision of the CSL which she underwent on 2025. Radiology recommended that regardless of whether the patient undergoes surgical excisional biopsy of the left breast 10:00 complex sclerosing lesion, she is still recommended for a follow-up bilateral dynamic contrast-enhanced breast MRI is recommended in 6 months to ensure stability of additional morphologically similar areas of heterogeneous non-mass enhancement in both breasts. As the findings are more conspicuous/reproducible on breast MRI compared to mammogram and ultrasound, the recommended breast MRI can be performed in lieu of the diagnostic mammogram and ultrasound to complete the 2-year follow-up process.     Imagin2023 BL DG MG and BL Breast US Complete - BIRADS 3:   1. Oval, mixed echogenicity mass with indistinct margins seen sonographically in the 11:00 left breast, 6 cm from the nipple, without a mammographic correlate is probably benign.  This is favored to represent fat necrosis in the setting of trauma in this region.  A follow-up targeted left breast ultrasound and possible left diagnostic mammogram in 3 months is recommended.  2. Oval, anechoic mass with circumscribed margins measuring up to 22 mm in the 10:00 left breast, 3 cm from the nipple, is probably benign.  This is favored to represent a cyst cluster.  Attention at time of follow-up is recommended.  3. Oval, hypoechoic mass with circumscribed margins measuring up to 9 mm in the 4:00 left breast, 4 cm from the nipple, is probably benign.  It is favored to represent a complicated cyst versus a fibroadenoma.  Attention at time of follow-up is recommended.  2023 L DG MG and L breast US - BIRADS 4:   1. Oval, mixed echogenicity mass with indistinct margins measuring up to 12 mm in the 11:00 left breast, 6 cm from the  nipple, is now considered suspicious given that it is now palpable and given the interval increase in size.  Left breast ultrasound-guided core needle biopsy is recommended. (Results below - benign and a follow-up left diagnostic mammogram and targeted left breast ultrasound is recommended in 6 months to demonstrate stability of the additional masses assessed as probably benign)  2. Oval, anechoic mass with circumscribed margins measuring up to 20 mm in the 10:00 left breast, 3 cm from the nipple, is not significantly changed compared to the 05/23/2023 examination and thus remains probably benign.  Final recommendations regarding this mass will be made once the above biopsy results become available.  3. Oval, hypoechoic mass with circumscribed margins measuring up to 9 mm in the 4:00 left breast, 4 cm from the nipple, is not significantly changed compared to the 05/23/2023 examination and thus remains probably benign.  Final recommendations regarding this mass will be made once the above biopsy results become available.    1/17/2024 L DG MG and L Breast US complete - BIRADS 3:  1.  Left breast 10:00 3 cm from the nipple posterior depth grouping of avascular anechoic masses with circumscribed margins, together spanning approximately 2.6 cm, is not significantly changed compared to previous exams dating back to 5/23/2023 (8 months ago) and remains probably benign.  This is again favored to represent a cyst cluster.  2.  Left breast 4:00 4 cm from the nipple 1.0 cm avascular mixed echogenicity mass with circumscribed margins is not significantly changed compared to previous exams dating back to 5/23/2023 (8 months ago) and remains probably benign.  This is again most likely a complicated cyst versus a fibroadenoma.  3.  Multiple additional benign cysts of varying size and complication in the left breast, several of which are new or increased in size compared to previous exams.  The largest of these benign cysts in the  3:00 left breast 4 cm from the nipple measures 1.7 cm.  4.  Prior benign core needle biopsy of the 11:00 left breast 6 cm from the nipple on 7/17/2023 (organizing fat necrosis), with no detrimental interval change at the biopsy site.  RECOMMENDATIONS: Bilateral diagnostic mammography with tomosynthesis, followed by targeted left breast ultrasound, will be due in 4 months as part of the two year follow-up process for probably benign findings (May 2024).    5/21/2024 BL DG MG and L breast US - BIRADS 3:   1) Left breast 10:00 axis 3 cm FN 1.7 x 0.7 x 2.7 cm grouping of oval parallel hypoechoic circumscribed avascular masses is not significantly changed compared to prior mammograms and ultrasounds dating back to 05/23/2023 (1 year ago) and remains probably benign.  BI-RADS 3: Probably benign.    2) Left breast 4:00 axis 4 cm FN 0.8 x 0.5 x 0.9 cm oval parallel hypoechoic/anechoic circumscribed avascular mass is stable compared to prior ultrasounds dating back to 05/23/2023 and remains probably benign.  Favor this to represent a complicated cyst.  BI-RADS 3: Probably benign.    3) No mammographic evidence of malignancy in the right breast.  4) Cystic mastopathy of the left breast is benign.  BI-RADS 2: Benign.  RECOMMENDATIONS: Recommend continued follow-up of the left breast 10:00 axis 3 cm FN and left breast 4:00 axis 4 cm FN probably benign findings according to the standard BI-RADS 3 protocol.  Next exam due is bilateral diagnostic mammogram and limited left breast ultrasound in May, 2025.  That exam will represent 2 years of follow-up from the time of initial probably benign assessment.    5.  11/25/2025 MRI Breast at OL - BIRADS 4: SUSPICIOUS OF MALIGNANCY   1.  Multiple morphologically similar focal areas of heterogeneous non-mass enhancement with similar enhancement kinetics within bilateral breasts, the largest in the 10:00 left breast middle to posterior depths correlating with the conglomerate of oval  hypoechoic masses seen in the 10:00 left breast 3 cm from the nipple on previous ultrasound of 5/21/2024. Ultrasound-guided core needle biopsy of the left breast 10:00 3 cm from the nipple conglomerate of oval hypoechoic masses seen on previous ultrasound of 5/21/2024, correlating with the largest focal area of heterogeneous non-mass enhancement on MRI, is recommended. Pathology results from this biopsy can be applied to the additional morphologically similar focal areas of heterogeneous non-mass enhancement within bilateral breasts.   2.  Left breast 11:00 middle depth biopsy clip with expected post biopsy change related to prior benign ultrasound-guided core needle biopsy of 7/17/2023 (organizing fat necrosis).  No abnormal enhancement at the biopsy site.    6.  12/4/2024 US guided biopsy of left breast 10:00 3 cmfn 2.7 cm conglomerate of oval hypoechoic masses seen on MRI was performed, revealing CSL. Recommendations following this biopsy is as follows:  1.  Surgical consultation is recommended for the biopsy-proven high-risk benign complex sclerosing lesion in the 10:00 left breast 3 cm from the nipple, to discuss potential surgical excisional biopsy versus short interval follow-up.  2.  Regardless of whether the patient undergoes surgical excisional biopsy of the left breast 10:00 complex sclerosing lesion, a follow-up bilateral dynamic contrast-enhanced breast MRI is recommended in 6 months to ensure stability of additional morphologically similar areas of heterogeneous non-mass enhancement in both breasts.    -  One of these areas of heterogeneous non-mass enhancement in the 4:00 left breast correlates with an oval hypoechoic/anechoic circumscribed avascular mass noted to be stable compared to prior ultrasounds dating back to 5/23/2023, and was previously assessed as probably benign with diagnostic mammogram and ultrasound recommended in May 2025 to complete the 2-year follow-up process per BI-RADS 3  protocol.  As the findings are more conspicuous/reproducible on breast MRI compared to mammogram and ultrasound, the recommended breast MRI can be performed in lieu of the diagnostic mammogram and ultrasound to complete the 2-year follow-up process.  3.  Annual mammography of bilateral breasts will be also be due in May 2025, and will be performed following the recommended follow up breast MRI.        Pathology:   2023 LEFT BREAST 11 O'CLOCK 6 CMFN AREA OF PALP:   ORGANIZING FAT NECROSIS. NO EVIDENCE OF MALIGNANCY.     2.    2024 LEFT BREAST AT 10 O'CLOCK, 3 CM FROM THE NIPPLE, BIOPSY:   COMPLEX SCLEROSING LESION, 12 MM, ENCOMPASSING PROMINENT SCLEROSING ADENOSIS, CYSTIC CHANGE, APOCRINE CHANGE, HYALINIZING FIBROSIS AND FOCAL INTRADUCTAL CALCIFICATION. NO ATYPIA.     3.    2025 LEFT BREAST, LUMPECTOMY:   COMPLEX SCLEROSING LESION WITH EXTENSIVE SCLEROSING ADENOSIS, FIBROCYSTIC AND LACTATIONAL CHANGES AND MICROCALCIFICATIONS; NO ATYPIA. CHANGES CONSISTENT WITH PRIOR BIOPSY SITE.       OB/GYN History:  Age at Menarche Onset: 12  Menopausal Status: premenopausal, LMP: Patient's last menstrual period was 2025.  Hysterectomy/Oophorectomy: no, neither  Hormonal birth control (duration): none  Pregnancy History:   Age at first live birth: n/a  Hormone Replacement Therapy: No, none    Other:  MG breast density: BIRADS C  Prior thoracic RT: none  Genetic testing: none  Ashkenazi Druze descent: No    Family History:  Family History   Problem Relation Name Age of Onset    Arthritis Mother Charline Dorman     Rheum arthritis Mother Charline Dorman     Hypertension Mother Charline Dorman     No Known Problems Father      Breast cancer Other Merline Plaine 45        great aunt    Breast cancer Half-sister Rachelle Koch         paternal    Diabetes Maternal Grandmother Carla     Hypertension Sister Karress     Thyroid disease Maternal Aunt Tanickia         Patient History:  Past Medical History:    Diagnosis Date    Anemia     Anxiety     Hyperthyroidism     Menstrual symptom or sign        Active Problem List with Overview Notes    Diagnosis Date Noted    Complex sclerosing lesion of left breast 12/18/2024    Fibrocystic breast changes 12/18/2024    Fat necrosis of left breast 12/18/2024    COVID-19 12/09/2024    Chest tightness 12/09/2024    Acute maxillary sinusitis 12/09/2024    Vaginal candida 12/09/2024    Viral URI with cough 11/27/2024    Abdominal lump 08/22/2024    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    Umbilical hernia without obstruction and without gangrene 08/22/2024    Urticaria 08/07/2024    Hyperthyroidism     Overweight (BMI 25.0-29.9) 04/17/2024    Hordeolum internum of right upper eyelid 04/17/2024    Iron deficiency anemia 05/12/2023    Low platelet count 05/12/2023        Past Surgical History:   Procedure Laterality Date    BREAST SURGERY  1/24/2025    CERVICAL SPINE SURGERY N/A 08/11/2022    EXCISION, MASS, BREAST, USING RADIOLOGICAL MARKER Left 01/24/2025    Procedure: EXCISION,MASS,BREAST,USING RADIOLOGICAL MARKER // Left;  Surgeon: Elaine Fong MD;  Location: [facility];  Service: General;  Laterality: Left;    FRACTURE SURGERY  8/11/2022    NASAL SEPTOPLASTY      SINUS SURGERY  09/2024    SPINE SURGERY  8/11/2022       Social History     Socioeconomic History    Marital status: Single   Tobacco Use    Smoking status: Never     Passive exposure: Never    Smokeless tobacco: Never   Substance and Sexual Activity    Alcohol use: Never    Drug use: Never    Sexual activity: Yes     Partners: Male     Birth control/protection: None     Social Drivers of Health     Financial Resource Strain: Medium Risk (2/21/2024)    Overall Financial Resource Strain (CARDIA)     Difficulty of Paying Living Expenses: Somewhat hard   Food Insecurity: Food Insecurity Present (2/21/2024)    Hunger Vital Sign     Worried About Running Out of Food in the Last Year: Sometimes true     Ran Out of Food in the Last  Year: Sometimes true   Transportation Needs: No Transportation Needs (2/21/2024)    PRAPARE - Transportation     Lack of Transportation (Medical): No     Lack of Transportation (Non-Medical): No   Physical Activity: Sufficiently Active (2/21/2024)    Exercise Vital Sign     Days of Exercise per Week: 4 days     Minutes of Exercise per Session: 60 min   Stress: Stress Concern Present (2/21/2024)    [place] [employer] - Occupational Stress Questionnaire     Feeling of Stress : Very much   Housing Stability: Low Risk  (2/21/2024)    Housing Stability Vital Sign     Unable to Pay for Housing in the Last Year: No     Number of Places Lived in the Last Year: 1     Unstable Housing in the Last Year: No       Immunization History   Administered Date(s) Administered    COVID-19 MRNA, LN-S PF (MODERNA HALF 0.25 ML DOSE) 12/23/2021    COVID-19, MRNA, LN-S, PF (MODERNA FULL 0.5 ML DOSE) 03/05/2021, 04/05/2021    DTaP 1982, 1982, 02/25/1983, 11/08/1984, 08/27/1987    MMR 11/08/1984    OPV 1982, 1982, 02/25/1983, 11/08/1984, 08/27/1987    PPD Test 04/06/2000    Tdap 05/14/2021       Medications/Allergies:    Current Outpatient Medications:     butalbital-acetaminophen-caff (FIORICET) -40 mg Cap, 1 capsule as needed Orally every 4 hrs, Disp: , Rfl:     clindamycin-benzoyl peroxide gel, Apply 45 g topically once daily., Disp: , Rfl:     cyclobenzaprine (FLEXERIL) 10 MG tablet, Take 10 mg by mouth 2 (two) times daily as needed., Disp: , Rfl:     ergocalciferol (ERGOCALCIFEROL) 50,000 unit Cap, TAKE 1 CAPSULE BY MOUTH EVERY 7 DAYS, Disp: 12 capsule, Rfl: 0    ferrous gluconate 324 mg (37.5 mg iron) Tab tablet, 324 mg., Disp: , Rfl:     fluticasone propionate (FLONASE) 50 mcg/actuation nasal spray, SPRAY TWICE IN EACH NOSTRIL ONCE DAILY, Disp: , Rfl:     levocetirizine (XYZAL) 5 MG tablet, TAKE 1 TABLET(5 MG) BY MOUTH EVERY EVENING, Disp: 90 tablet, Rfl: 0    loratadine (CLARITIN) 10  "mg tablet, 10 mg., Disp: , Rfl:     methIMAzole (TAPAZOLE) 5 MG Tab, 5 mg., Disp: , Rfl:      Review of patient's allergies indicates:  No Known Allergies    Review of Systems:  ROS     Objective:     Vitals:  Vitals:    02/11/25 0836   BP: 112/76   BP Location: Right arm   Patient Position: Sitting   Pulse: 79   Resp: 18   Temp: 98.1 °F (36.7 °C)   TempSrc: Oral   SpO2: 97%   Weight: 80.3 kg (177 lb)   Height: 5' 4" (1.626 m)           Body mass index is 30.38 kg/m².     Physical Exam:  General: The patient is awake, alert and oriented times three. The patient is well nourished and in no acute distress.  Neck: There is no evidence of palpable cervical, supraclavicular or axillary adenopathy. The neck is supple. The thyroid is not enlarged.  Musculoskeletal: The patient has a normal range of motion of her bilateral upper extremities.  Chest: Examination of the chest wall fails to reveal any obvious abnormalities.  The lungs are clear to auscultation bilaterally without rales, rhonchi, or wheezing.  Cardiovascular: The heart has a regular rate and rhythm without murmurs, gallops or rubs.  Breast:   Right:  Examination of right breast fails to reveal any dominant masses or areas of significant focal nodularity. The nipple is everted without evidence of discharge. There is no skin dimpling with movement of the pectoralis. There is no significant skin changes overlying the breast.   Left:  Examination of the left breast fails to reveal any dominant masses or areas of significant focal nodularity. Particularly, no palpable masses or area of focal nodularity in the UIQ where palpable concern was previously present. The nipple is everted without evidence of discharge. There is no skin dimpling with movement of the pectoralis. There are no significant skin changes overlying the breast.  Abdomen: The abdomen is soft, flat, nontender and nondistended with no palpable masses or organomegaly.  Integumentary: no rashes or skin " lesions present  Neurologic: cranial nerves intact, no signs of peripheral neurological deficit, motor/sensory function intact    Assessment:     Patient Active Problem List   Diagnosis    Iron deficiency anemia    Low platelet count    Overweight (BMI 25.0-29.9)    Hordeolum internum of right upper eyelid    Urticaria    Hyperthyroidism    Abdominal lump    Accessory spleen    Umbilical hernia without obstruction and without gangrene    Viral URI with cough    COVID-19    Chest tightness    Acute maxillary sinusitis    Vaginal candida    Complex sclerosing lesion of left breast    Fibrocystic breast changes    Fat necrosis of left breast      She is s/p left breast excisional biopsy on 1/24/2025. Surgical pathology revealed a complex sclerosing lesion. She is doing well post op. Reports incision is healing well but she did have a lot of bruising and intermittent pain/sensitivity since surgery. She is healing well on today's exam with expected post op changes. Encouraged continue supportive bra wear and may start home stretches/exercises as described in patient education sheet. Gradual return to activities as tolerated. Expected return to work on 2/17.    Reassurance provided regarding benign pathology showing a CSL. Recommend continue imaging follow up as already scheduled and planned as described below. Also, will establish patient in high risk clinic for regular follow up and CBE.      Plan:      Follow-up bilateral dynamic contrast-enhanced breast MRI is recommended for 6 month f/u to ensure stability of additional morphologically similar areas of heterogeneous non-mass enhancement in both breasts. This has already been scheduled and will be done in May.    Diagnostic mammogram has also been scheduled already after MRI, but will determine whether to proceed with this after imaging. Advised she keep these appointments for now.    RTC after MRI for high risk f/u.        CC: Dr. Thelma Dougherty       All of  her questions were answered. She was advised to call if she develops any questions or concerns.    Sharon Ybarra PA-C         --------------------------------------------------------------------------------------------------------------  POST OP VISIT

## 2025-03-11 ENCOUNTER — PATIENT MESSAGE (OUTPATIENT)
Dept: FAMILY MEDICINE | Facility: CLINIC | Age: 45
End: 2025-03-11
Payer: COMMERCIAL

## 2025-03-12 ENCOUNTER — E-VISIT (OUTPATIENT)
Dept: FAMILY MEDICINE | Facility: CLINIC | Age: 45
End: 2025-03-12
Payer: COMMERCIAL

## 2025-03-12 ENCOUNTER — TELEPHONE (OUTPATIENT)
Dept: FAMILY MEDICINE | Facility: CLINIC | Age: 45
End: 2025-03-12
Payer: COMMERCIAL

## 2025-03-12 DIAGNOSIS — R14.0 ABDOMINAL BLOATING: Primary | ICD-10-CM

## 2025-03-12 DIAGNOSIS — D50.9 IRON DEFICIENCY ANEMIA, UNSPECIFIED IRON DEFICIENCY ANEMIA TYPE: ICD-10-CM

## 2025-03-12 DIAGNOSIS — R14.0 ABDOMINAL DISTENSION (GASEOUS): ICD-10-CM

## 2025-03-13 NOTE — PROGRESS NOTES
Patient ID: Doreen Dorman is a 44 y.o. female.    Chief Complaint: General Illness (Entered automatically based on patient selection in [employer].)    The patient initiated a request through [employer] on 3/12/2025 for evaluation and management with a chief complaint of General Illness (Entered automatically based on patient selection in [employer].)     I evaluated the questionnaire submission on 03/13/2025.    [facility]s Peq Evisit General    3/12/2025  5:56 PM CDT - Filed by Patient   Do you agree to participate in an E-Visit? Yes   If you have any of the following symptoms, please go to the nearest emergency room or call 911: I acknowledge   Choose the state of your primary residence [place]   Do you have any of the following pregnancy-related conditions? None   What is the main issue you would like addressed today? Excessive bloatinf   Please describe your symptoms. Bloating and brain fog   Where is your problem located? [place]   On a scale of 1-10, where 10 is the worst you can imagine, how severe are your symptoms? (range: 1 - 10) 5   Have you had these symptoms before? Yes   How long have you had these symptoms? More than a month   What helps with your symptoms? Nothing   What makes your symptoms feel worse? After earing   Are your symptoms related to a condition you currently have? Not sure   Please describe any probable cause for your symptoms. Maybe a gluten sensitivity   Provide any additional information you feel is important. N/A   Please attach any relevant images or files    Are you able to take your vital signs? No         Encounter Diagnoses   Name Primary?    Abdominal bloating Yes    Abdominal distension (gaseous)     Iron deficiency anemia, unspecified iron deficiency anemia type         Orders Placed This Encounter   Procedures    CT Abdomen Pelvis W Wo Contrast     Standing Status:   Future     Expected Date:   3/13/2025     Expiration Date:   3/13/2026     Is the patient allergic to iodine  contrast?:   No     Does this patient have impaired renal function?:   No     Does the patient have high blood pressure requiring medical treatment?:   No     Diabetes?:   No     May the Radiologist modify the order per protocol to meet the clinical needs of the patient?:   Yes     Oral/Rectal Contrast instructions::   Routine Oral Contrast     Special CT ABD Protocol Request?:   Routine    CBC Auto Differential     Standing Status:   Future     Expected Date:   3/13/2025     Expiration Date:   5/12/2026    Comprehensive Metabolic Panel     Standing Status:   Future     Expected Date:   3/13/2025     Expiration Date:   5/12/2026    Iron and TIBC     Standing Status:   Future     Expected Date:   3/13/2025     Expiration Date:   5/12/2026    Ferritin     Standing Status:   Future     Expected Date:   3/13/2025     Expiration Date:   5/12/2026    Folate     Standing Status:   Future     Expected Date:   3/13/2025     Expiration Date:   6/11/2026     Send normal result to authorizing provider's In Basket if patient is active on MyChart::   Yes    Vitamin B12     Standing Status:   Future     Expected Date:   3/13/2025     Expiration Date:   6/11/2026     Send normal result to authorizing provider's In Basket if patient is active on MyChart::   Yes    Celiac Disease Comprehensive Panel     Standing Status:   Future     Expected Date:   3/13/2025     Expiration Date:   5/12/2026    IgA     Standing Status:   Future     Expected Date:   3/13/2025     Expiration Date:   6/11/2026     Send normal result to authorizing provider's In Basket if patient is active on MyChart::   Yes    Celiac Disease Panel     Standing Status:   Future     Expected Date:   3/13/2025     Expiration Date:   6/11/2026     Send normal result to authorizing provider's In Basket if patient is active on MyChart::   Yes    Celiac Disease Panel     Standing Status:   Future     Expected Date:   3/13/2025     Expiration Date:   6/11/2026     Send normal  result to authorizing provider's In Basket if patient is active on MyChart::   Yes    Urinalysis, Reflex to Urine Culture     Standing Status:   Future     Expected Date:   3/13/2025     Expiration Date:   5/12/2026     Specimen Source:   Urine            I ordered CT of your abdomen and pelvis to evaluate your abdominal bloating , the CT will need to be scheduled and is with contrast, so she will need to have lab work done at her earliest convenience at any [facility] of her choice except for Urgent Care Centers, the contrast is processed through the kidneys and an updated kidney function is required prior to the CT. She will be contacted with an appointment date, time, and location for the CT once it is scheduled. I also placed order for a celiac disease panel and urinalysis. Will treat pending results. If workup is negative and symptoms are refractory to treatment, will proceed with a referral to a Gastro specialist for further evaluation. For the brain fog and with her history of iron deficiency anemia, I added an anemia panel to her lab orders, needs to follow iron rich diet, will treat pending results. Notify M.D. or ER if symptoms persist or worsen, shortness of breath, chest pain, palpitations, vomiting, abdominal pain, bloody stools, feeling like passing out, active bleeding, temp >100.4, or any acute illness.        Follow up if symptoms worsen or fail to improve.      E-Visit Time Tracking:    Day 1 Time (in minutes): 14    Total Time (in minutes): 14

## 2025-03-24 ENCOUNTER — HOSPITAL ENCOUNTER (OUTPATIENT)
Dept: RADIOLOGY | Facility: HOSPITAL | Age: 45
Discharge: HOME | End: 2025-03-24
Attending: FAMILY MEDICINE
Payer: COMMERCIAL

## 2025-03-24 DIAGNOSIS — R14.0 ABDOMINAL BLOATING: ICD-10-CM

## 2025-03-24 DIAGNOSIS — R14.0 ABDOMINAL DISTENSION (GASEOUS): ICD-10-CM

## 2025-03-24 PROCEDURE — 74178 CT ABD&PLV WO CNTR FLWD CNTR: CPT | Mod: TC

## 2025-03-24 PROCEDURE — 25500020 PHARM REV CODE 255: Performed by: FAMILY MEDICINE

## 2025-03-24 RX ORDER — DIATRIZOATE MEGLUMINE AND DIATRIZOATE SODIUM 660; 100 MG/ML; MG/ML
30 SOLUTION ORAL; RECTAL
Status: COMPLETED | OUTPATIENT
Start: 2025-03-24 | End: 2025-03-24

## 2025-03-24 RX ADMIN — DIATRIZOATE MEGLUMINE AND DIATRIZOATE SODIUM 30 ML: 600; 100 SOLUTION ORAL; RECTAL at 10:03

## 2025-03-24 RX ADMIN — IOHEXOL 100 ML: 350 INJECTION, SOLUTION INTRAVENOUS at 10:03

## 2025-03-25 ENCOUNTER — RESULTS FOLLOW-UP (OUTPATIENT)
Dept: FAMILY MEDICINE | Facility: CLINIC | Age: 45
End: 2025-03-25

## 2025-03-25 DIAGNOSIS — K76.89 BENIGN LIVER CYST: Primary | ICD-10-CM

## 2025-03-25 DIAGNOSIS — K59.00 CONSTIPATION, UNSPECIFIED CONSTIPATION TYPE: ICD-10-CM

## 2025-03-25 DIAGNOSIS — E27.8 ADRENAL CYST: ICD-10-CM

## 2025-03-26 ENCOUNTER — TELEPHONE (OUTPATIENT)
Dept: FAMILY MEDICINE | Facility: CLINIC | Age: 45
End: 2025-03-26
Payer: COMMERCIAL

## 2025-04-29 ENCOUNTER — TELEPHONE (OUTPATIENT)
Dept: FAMILY MEDICINE | Facility: CLINIC | Age: 45
End: 2025-04-29
Payer: COMMERCIAL

## 2025-04-29 DIAGNOSIS — E27.8 ADRENAL CYST: ICD-10-CM

## 2025-04-29 DIAGNOSIS — K76.89 BENIGN LIVER CYST: Primary | ICD-10-CM

## 2025-04-29 NOTE — TELEPHONE ENCOUNTER
I left a vm for pt to give me a call back at the clinic regarding her MRI that is scheduled for 5/2/25.

## 2025-05-02 ENCOUNTER — LAB VISIT (OUTPATIENT)
Dept: LAB | Facility: HOSPITAL | Age: 45
End: 2025-05-02
Attending: FAMILY MEDICINE
Payer: COMMERCIAL

## 2025-05-02 DIAGNOSIS — K76.89 BENIGN LIVER CYST: ICD-10-CM

## 2025-05-02 DIAGNOSIS — E27.8 ADRENAL CYST: ICD-10-CM

## 2025-05-02 LAB — B-HCG UR QL: NEGATIVE

## 2025-05-02 PROCEDURE — 81025 URINE PREGNANCY TEST: CPT

## 2025-05-03 ENCOUNTER — RESULTS FOLLOW-UP (OUTPATIENT)
Dept: FAMILY MEDICINE | Facility: CLINIC | Age: 45
End: 2025-05-03

## 2025-05-05 ENCOUNTER — RESULTS FOLLOW-UP (OUTPATIENT)
Dept: FAMILY MEDICINE | Facility: CLINIC | Age: 45
End: 2025-05-05

## 2025-05-05 ENCOUNTER — PATIENT MESSAGE (OUTPATIENT)
Dept: FAMILY MEDICINE | Facility: CLINIC | Age: 45
End: 2025-05-05
Payer: COMMERCIAL

## 2025-05-06 ENCOUNTER — PATIENT MESSAGE (OUTPATIENT)
Dept: FAMILY MEDICINE | Facility: CLINIC | Age: 45
End: 2025-05-06
Payer: COMMERCIAL

## 2025-05-08 ENCOUNTER — OFFICE VISIT (OUTPATIENT)
Dept: SURGERY | Facility: CLINIC | Age: 45
End: 2025-05-08
Payer: COMMERCIAL

## 2025-05-08 VITALS
TEMPERATURE: 99 F | HEIGHT: 64 IN | DIASTOLIC BLOOD PRESSURE: 74 MMHG | SYSTOLIC BLOOD PRESSURE: 109 MMHG | OXYGEN SATURATION: 97 % | BODY MASS INDEX: 29.19 KG/M2 | HEART RATE: 80 BPM | RESPIRATION RATE: 18 BRPM | WEIGHT: 171 LBS

## 2025-05-08 DIAGNOSIS — Z91.89 AT HIGH RISK FOR BREAST CANCER: Primary | ICD-10-CM

## 2025-05-08 DIAGNOSIS — Z12.31 SCREENING MAMMOGRAM FOR BREAST CANCER: ICD-10-CM

## 2025-05-08 DIAGNOSIS — Z80.3 FAMILY HISTORY OF BREAST CANCER: ICD-10-CM

## 2025-05-08 PROCEDURE — 99999 PR PBB SHADOW E&M-EST. PATIENT-LVL IV: CPT | Mod: PBBFAC,,,

## 2025-05-08 RX ORDER — DICLOFENAC SODIUM 75 MG/1
75 TABLET, DELAYED RELEASE ORAL 2 TIMES DAILY
COMMUNITY
Start: 2025-03-18

## 2025-05-08 RX ORDER — CETIRIZINE HYDROCHLORIDE 10 MG/1
10 TABLET ORAL
COMMUNITY
Start: 2025-03-16

## 2025-05-08 NOTE — PATIENT INSTRUCTIONS
What is breast pain?  Breast pain (mastalgia) is a common type of discomfort among women, affecting as many as seven in 10 women at some point in their lives.  About 10 percent of women have moderate to severe breast pain more than five days a month. In some cases, severe breast pain lasts throughout the menstrual cycles.  The symptom occurs most frequently in young, premenopausal and perimenopausal women.  Breast pain alone rarely signifies breast cancer. Still, if you have unexplained breast pain that persists, get checked by your provider.     Causes of breast pain  Most of the time, it's not possible to identify the exact cause of breast pain. Likely contributors are hormonal changes, anatomical issues (breast trauma, prior breast surgery, or chest wall pain), breast size, and medication use (oral contraceptives, infertility treatments, estrogen/progesterone hormone therapy, and some antidepressants).     Treatments and supplements for breast pain  Use hot or cold compresses on your breasts.  Wear a firm support bra, fitted by a professional if possible.  Wear a sports bra during exercise and while sleeping, especially when your breast may be more sensitive.  Limit or eliminate caffeine, a dietary change many women swear by.  Decrease the fat in your diet.  Use a pain reliever, such as acetaminophen (Tylenol) or ibuprofen (Advil, Motrin) to alleviate breast pain.  May try Voltaren gel topical applications (2 g up to 4 times per day) to affected area of the breast.  Keep a journal noting when you experience breast pain and other symptoms, to determine if your pain is cyclic.  Vitamin E can help with breast pain (up to1,200 IU per day). Many women have found it to be helpful, but it usually takes up to 2 months of taking this to see a change.  Evening primrose oil; this supplement appears to change the balance of fatty acids in your cells, which may reduce breast pain. You can take a 1000 mg capsule up to three  times a day.     Adopted from [email or web address]

## 2025-05-10 NOTE — TELEPHONE ENCOUNTER
Are there any outstanding tasks in the patients's chart (ex.labs,MM,etc)  no  Do we have outstanding/pending referrals  no  Has the patient been seen in and ER,U[facility], or been admitted since last visit  no  Has the patient seen any other health care provider(doctors) since last visit  no  Has the patient had any bloodwork or x-rays done since last visit  no    Orthostatic Vitals:      5/10/2025    12:42 AM   Orthostatic Vitals   Orthostatic B/P and Pulse? Yes   Blood Pressure Lying 135/81   Pulse Lying 78 PER MINUTE   Blood Pressure Sitting 139/81   Pulse Sitting 77 PER MINUTE   Blood Pressure Standing 138/82   Pulse Standing 79 PER MINUTE

## 2025-05-20 ENCOUNTER — HOSPITAL ENCOUNTER (OUTPATIENT)
Dept: RADIOLOGY | Facility: HOSPITAL | Age: 45
Discharge: HOME | End: 2025-05-20
Payer: COMMERCIAL

## 2025-05-20 DIAGNOSIS — Z91.89 AT HIGH RISK FOR BREAST CANCER: ICD-10-CM

## 2025-05-20 DIAGNOSIS — Z80.3 FAMILY HISTORY OF BREAST CANCER: ICD-10-CM

## 2025-05-20 DIAGNOSIS — Z12.31 SCREENING MAMMOGRAM FOR BREAST CANCER: ICD-10-CM

## 2025-05-20 PROCEDURE — 77063 BREAST TOMOSYNTHESIS BI: CPT | Mod: 26,,, | Performed by: RADIOLOGY

## 2025-05-20 PROCEDURE — 77067 SCR MAMMO BI INCL CAD: CPT | Mod: 26,,, | Performed by: RADIOLOGY

## 2025-05-20 PROCEDURE — 77067 SCR MAMMO BI INCL CAD: CPT | Mod: TC

## 2025-05-28 ENCOUNTER — RESULTS FOLLOW-UP (OUTPATIENT)
Dept: SURGERY | Facility: CLINIC | Age: 45
End: 2025-05-28

## 2025-05-30 ENCOUNTER — E-VISIT (OUTPATIENT)
Dept: URGENT CARE | Facility: CLINIC | Age: 45
End: 2025-05-30
Payer: COMMERCIAL

## 2025-05-30 DIAGNOSIS — L30.8 OTHER ECZEMA: Primary | ICD-10-CM

## 2025-05-30 RX ORDER — TRIAMCINOLONE ACETONIDE 1 MG/G
CREAM TOPICAL
Qty: 30 G | Refills: 0 | Status: SHIPPED | OUTPATIENT
Start: 2025-05-30

## 2025-05-30 NOTE — PROGRESS NOTES
Patient ID: Doreen Dorman is a 44 y.o. female.        E-Visit Time Tracking:   Day 1 Time (in minutes): 5  Total Time (in minutes): 5      Chief Complaint: General Illness (Entered automatically based on patient selection in [employer].)      The patient initiated a request through [employer] on 5/30/2025 for evaluation and management with a chief complaint of General Illness (Entered automatically based on patient selection in [employer].)     I evaluated the questionnaire submission on 05/30/2025.    Ohs Peq Evisit Supergroup-Common Problems    5/30/2025  1:53 PM CDT - Filed by Patient   What do you need help with? Rash   Do you agree to participate in an E-Visit? Yes   If you have any of the following symptoms, please present to your local emergency room or call 911:  I acknowledge   Do you have any of the following pregnancy-related conditions? None   What is the main issue you would like addressed today? Rash   How would you describe your skin concern? Rash   When did your concern begin? 5/23/2025   Where is your skin concern located? Arm(s)   Does the affected area itch? No   Does the affected area hurt? No   Does the affected area have discharge or drainage? No   Have you noticed any bleeding in the affected area? No   How would you describe your skin concern? Spots;  Raised;  Solid or firm   How would you describe the color of the affected area(s)? Darker than skin;  Lighter than skin   How has the affected area changed over time? No change   How often do you have this skin concern? New problem   How long does your skin problem last? Days   Have you been exposed to any of the following? Soap   Have you used any of the following to treat your skin concern? Moisturizer or lotion   If you have used anything for treatment, has it helped the symptoms? No   Do you have any of the following additional symptoms with your skin concern? None   Provide any additional information you feel is important.    At least one  photo is required for treatment to be provided. You can upload a maximum of three photos of the affected area.     Are you able to take your vital signs? No         Encounter Diagnosis   Name Primary?    Other eczema Yes        No orders of the defined types were placed in this encounter.     Medications Ordered This Encounter   Medications    triamcinolone acetonide 0.1% (KENALOG) 0.1 % cream     Sig: Apply to affected area BID PRN to rash. Do not use for more than 2 weeks in a row.     Dispense:  30 g     Refill:  0        No follow-ups on file.

## 2025-06-10 DIAGNOSIS — D50.9 IRON DEFICIENCY ANEMIA, UNSPECIFIED IRON DEFICIENCY ANEMIA TYPE: ICD-10-CM

## 2025-06-11 RX ORDER — FERROUS GLUCONATE 324(38)MG
1 TABLET ORAL 2 TIMES DAILY WITH MEALS
Qty: 60 TABLET | Refills: 2 | Status: SHIPPED | OUTPATIENT
Start: 2025-06-11

## 2025-06-18 ENCOUNTER — PATIENT MESSAGE (OUTPATIENT)
Dept: SURGERY | Facility: CLINIC | Age: 45
End: 2025-06-18
Payer: COMMERCIAL

## 2025-06-19 NOTE — TELEPHONE ENCOUNTER
I spoke with patient. She states that she has been having breast pain that started since her cycle began a few days ago.  I instructed patient that hormone fluctuations, caffeine, alcohol can increase breast pain, Instructed patient to use good support bra, use NSAIDs and to use Voltaren gel on the area of her breast that is bothersome. I told her to try this for a couple of weeks, and see if she gets any relief.  Instructed to call office back if pain gets worse, or no relief to pain. She verbalized understanding.

## 2025-08-18 ENCOUNTER — OFFICE VISIT (OUTPATIENT)
Dept: FAMILY MEDICINE | Facility: CLINIC | Age: 45
End: 2025-08-18
Payer: COMMERCIAL

## 2025-08-18 VITALS
HEART RATE: 75 BPM | WEIGHT: 170 LBS | OXYGEN SATURATION: 99 % | SYSTOLIC BLOOD PRESSURE: 115 MMHG | BODY MASS INDEX: 29.02 KG/M2 | HEIGHT: 64 IN | DIASTOLIC BLOOD PRESSURE: 75 MMHG | TEMPERATURE: 98 F | RESPIRATION RATE: 16 BRPM

## 2025-08-18 DIAGNOSIS — F41.1 GENERALIZED ANXIETY DISORDER: ICD-10-CM

## 2025-08-18 DIAGNOSIS — D50.9 IRON DEFICIENCY ANEMIA, UNSPECIFIED IRON DEFICIENCY ANEMIA TYPE: ICD-10-CM

## 2025-08-18 DIAGNOSIS — Z00.00 WELLNESS EXAMINATION: Primary | ICD-10-CM

## 2025-08-18 DIAGNOSIS — N83.209 CYST OF OVARY, UNSPECIFIED LATERALITY: ICD-10-CM

## 2025-08-18 DIAGNOSIS — L30.9 ECZEMA, UNSPECIFIED TYPE: ICD-10-CM

## 2025-08-18 DIAGNOSIS — E55.9 VITAMIN D DEFICIENCY: ICD-10-CM

## 2025-08-18 DIAGNOSIS — E05.90 HYPERTHYROIDISM: ICD-10-CM

## 2025-08-18 LAB
25(OH)D3+25(OH)D2 SERPL-MCNC: 15 NG/ML (ref 30–80)
ALBUMIN SERPL-MCNC: 4 G/DL (ref 3.5–5)
ALBUMIN/GLOB SERPL: 1.2 RATIO (ref 1.1–2)
ALP SERPL-CCNC: 63 UNIT/L (ref 40–150)
ALT SERPL-CCNC: 22 UNIT/L (ref 0–55)
ANION GAP SERPL CALC-SCNC: 8 MEQ/L
AST SERPL-CCNC: 21 UNIT/L (ref 11–45)
BACTERIA #/AREA URNS AUTO: ABNORMAL /HPF
BASOPHILS # BLD AUTO: 0.04 X10(3)/MCL
BASOPHILS NFR BLD AUTO: 0.6 %
BILIRUB SERPL-MCNC: 1 MG/DL
BILIRUB UR QL STRIP.AUTO: NEGATIVE
BUN SERPL-MCNC: 13 MG/DL (ref 7–18.7)
CALCIUM SERPL-MCNC: 9.1 MG/DL (ref 8.4–10.2)
CHLORIDE SERPL-SCNC: 106 MMOL/L (ref 98–107)
CHOLEST SERPL-MCNC: 170 MG/DL
CHOLEST/HDLC SERPL: 4 {RATIO} (ref 0–5)
CLARITY UR: CLEAR
CO2 SERPL-SCNC: 25 MMOL/L (ref 22–29)
COLOR UR AUTO: ABNORMAL
CREAT SERPL-MCNC: 0.72 MG/DL (ref 0.55–1.02)
CREAT/UREA NIT SERPL: 18
EOSINOPHIL # BLD AUTO: 0.07 X10(3)/MCL (ref 0–0.9)
EOSINOPHIL NFR BLD AUTO: 1 %
ERYTHROCYTE [DISTWIDTH] IN BLOOD BY AUTOMATED COUNT: 16.5 % (ref 11.5–17)
EST. AVERAGE GLUCOSE BLD GHB EST-MCNC: 108.3 MG/DL
FERRITIN SERPL-MCNC: 23.88 NG/ML (ref 4.63–204)
FOLATE SERPL-MCNC: 7.4 NG/ML (ref 7–31.4)
GFR SERPLBLD CREATININE-BSD FMLA CKD-EPI: >60 ML/MIN/1.73/M2
GLOBULIN SER-MCNC: 3.3 GM/DL (ref 2.4–3.5)
GLUCOSE SERPL-MCNC: 82 MG/DL (ref 74–100)
GLUCOSE UR QL STRIP: NORMAL
HBA1C MFR BLD: 5.4 %
HCT VFR BLD AUTO: 33.3 % (ref 37–47)
HDLC SERPL-MCNC: 47 MG/DL (ref 35–60)
HGB BLD-MCNC: 10 G/DL (ref 12–16)
HGB UR QL STRIP: NEGATIVE
IMM GRANULOCYTES # BLD AUTO: 0.08 X10(3)/MCL (ref 0–0.04)
IMM GRANULOCYTES NFR BLD AUTO: 1.1 %
IRON SATN MFR SERPL: 6 % (ref 20–50)
IRON SERPL-MCNC: 21 UG/DL (ref 50–170)
KETONES UR QL STRIP: NEGATIVE
LDLC SERPL CALC-MCNC: 112 MG/DL (ref 50–140)
LEUKOCYTE ESTERASE UR QL STRIP: NEGATIVE
LYMPHOCYTES # BLD AUTO: 1.5 X10(3)/MCL (ref 0.6–4.6)
LYMPHOCYTES NFR BLD AUTO: 20.7 %
MCH RBC QN AUTO: 24 PG (ref 27–31)
MCHC RBC AUTO-ENTMCNC: 30 G/DL (ref 33–36)
MCV RBC AUTO: 79.9 FL (ref 80–94)
MONOCYTES # BLD AUTO: 0.55 X10(3)/MCL (ref 0.1–1.3)
MONOCYTES NFR BLD AUTO: 7.6 %
MUCOUS THREADS URNS QL MICRO: ABNORMAL /LPF
NEUTROPHILS # BLD AUTO: 5 X10(3)/MCL (ref 2.1–9.2)
NEUTROPHILS NFR BLD AUTO: 69 %
NITRITE UR QL STRIP: NEGATIVE
NRBC BLD AUTO-RTO: 0 %
PH UR STRIP: 6 [PH]
PLATELET # BLD AUTO: 146 X10(3)/MCL (ref 130–400)
PLATELETS.RETICULATED NFR BLD AUTO: 17.5 % (ref 0.9–11.2)
PMV BLD AUTO: ABNORMAL FL
POTASSIUM SERPL-SCNC: 4.2 MMOL/L (ref 3.5–5.1)
PROT SERPL-MCNC: 7.3 GM/DL (ref 6.4–8.3)
PROT UR QL STRIP: NEGATIVE
RBC # BLD AUTO: 4.17 X10(6)/MCL (ref 4.2–5.4)
RBC #/AREA URNS AUTO: ABNORMAL /HPF
SODIUM SERPL-SCNC: 139 MMOL/L (ref 136–145)
SP GR UR STRIP.AUTO: 1.02 (ref 1–1.03)
SQUAMOUS #/AREA URNS LPF: ABNORMAL /HPF
T4 FREE SERPL-MCNC: 0.95 NG/DL (ref 0.7–1.48)
TIBC SERPL-MCNC: 346 UG/DL (ref 70–310)
TIBC SERPL-MCNC: 367 UG/DL (ref 250–450)
TRANSFERRIN SERPL-MCNC: 336 MG/DL (ref 180–382)
TRIGL SERPL-MCNC: 56 MG/DL (ref 37–140)
TSH SERPL-ACNC: 1.71 UIU/ML (ref 0.35–4.94)
UROBILINOGEN UR STRIP-ACNC: NORMAL
VIT B12 SERPL-MCNC: 649 PG/ML (ref 213–816)
VLDLC SERPL CALC-MCNC: 11 MG/DL
WBC # BLD AUTO: 7.24 X10(3)/MCL (ref 4.5–11.5)
WBC #/AREA URNS AUTO: ABNORMAL /HPF

## 2025-08-18 PROCEDURE — 83540 ASSAY OF IRON: CPT | Performed by: FAMILY MEDICINE

## 2025-08-18 PROCEDURE — 3008F BODY MASS INDEX DOCD: CPT | Mod: CPTII,,, | Performed by: FAMILY MEDICINE

## 2025-08-18 PROCEDURE — 82306 VITAMIN D 25 HYDROXY: CPT | Performed by: FAMILY MEDICINE

## 2025-08-18 PROCEDURE — 84443 ASSAY THYROID STIM HORMONE: CPT | Performed by: FAMILY MEDICINE

## 2025-08-18 PROCEDURE — 3078F DIAST BP <80 MM HG: CPT | Mod: CPTII,,, | Performed by: FAMILY MEDICINE

## 2025-08-18 PROCEDURE — 99214 OFFICE O/P EST MOD 30 MIN: CPT | Mod: 25,,, | Performed by: FAMILY MEDICINE

## 2025-08-18 PROCEDURE — 1159F MED LIST DOCD IN RCRD: CPT | Mod: CPTII,,, | Performed by: FAMILY MEDICINE

## 2025-08-18 PROCEDURE — 81001 URINALYSIS AUTO W/SCOPE: CPT | Performed by: FAMILY MEDICINE

## 2025-08-18 PROCEDURE — 82607 VITAMIN B-12: CPT | Performed by: FAMILY MEDICINE

## 2025-08-18 PROCEDURE — 1160F RVW MEDS BY RX/DR IN RCRD: CPT | Mod: CPTII,,, | Performed by: FAMILY MEDICINE

## 2025-08-18 PROCEDURE — 80061 LIPID PANEL: CPT | Performed by: FAMILY MEDICINE

## 2025-08-18 PROCEDURE — 82746 ASSAY OF FOLIC ACID SERUM: CPT | Performed by: FAMILY MEDICINE

## 2025-08-18 PROCEDURE — 84439 ASSAY OF FREE THYROXINE: CPT | Performed by: FAMILY MEDICINE

## 2025-08-18 PROCEDURE — 80053 COMPREHEN METABOLIC PANEL: CPT | Performed by: FAMILY MEDICINE

## 2025-08-18 PROCEDURE — 82728 ASSAY OF FERRITIN: CPT | Performed by: FAMILY MEDICINE

## 2025-08-18 PROCEDURE — 36415 COLL VENOUS BLD VENIPUNCTURE: CPT | Performed by: FAMILY MEDICINE

## 2025-08-18 PROCEDURE — 85025 COMPLETE CBC W/AUTO DIFF WBC: CPT | Performed by: FAMILY MEDICINE

## 2025-08-18 PROCEDURE — 83036 HEMOGLOBIN GLYCOSYLATED A1C: CPT | Performed by: FAMILY MEDICINE

## 2025-08-18 PROCEDURE — 3074F SYST BP LT 130 MM HG: CPT | Mod: CPTII,,, | Performed by: FAMILY MEDICINE

## 2025-08-18 PROCEDURE — 99396 PREV VISIT EST AGE 40-64: CPT | Mod: ,,, | Performed by: FAMILY MEDICINE

## 2025-08-18 RX ORDER — BUSPIRONE HYDROCHLORIDE 5 MG/1
5 TABLET ORAL 2 TIMES DAILY PRN
Qty: 60 TABLET | Refills: 2 | Status: SHIPPED | OUTPATIENT
Start: 2025-08-18 | End: 2025-11-16

## 2025-08-18 RX ORDER — TACROLIMUS 1 MG/G
OINTMENT TOPICAL 2 TIMES DAILY PRN
Qty: 60 G | Refills: 2 | Status: SHIPPED | OUTPATIENT
Start: 2025-08-18

## 2025-08-19 ENCOUNTER — TELEPHONE (OUTPATIENT)
Dept: FAMILY MEDICINE | Facility: CLINIC | Age: 45
End: 2025-08-19
Payer: COMMERCIAL

## 2025-08-25 ENCOUNTER — HOSPITAL ENCOUNTER (OUTPATIENT)
Dept: RADIOLOGY | Facility: HOSPITAL | Age: 45
Discharge: HOME | End: 2025-08-25
Attending: FAMILY MEDICINE
Payer: COMMERCIAL

## 2025-08-25 DIAGNOSIS — N83.209 CYST OF OVARY, UNSPECIFIED LATERALITY: ICD-10-CM

## 2025-08-25 PROBLEM — N83.201 RIGHT OVARIAN CYST: Status: ACTIVE | Noted: 2025-08-25

## 2025-08-25 PROBLEM — N88.8 NABOTHIAN CYST: Status: ACTIVE | Noted: 2025-08-25

## 2025-08-25 PROCEDURE — 76830 TRANSVAGINAL US NON-OB: CPT | Mod: TC

## 2025-08-26 ENCOUNTER — PATIENT MESSAGE (OUTPATIENT)
Dept: FAMILY MEDICINE | Facility: CLINIC | Age: 45
End: 2025-08-26
Payer: COMMERCIAL

## (undated) DEVICE — SUT MONOCRYL 3-0 PS-2 UND

## (undated) DEVICE — SUT MCRYL PLUS 3-0 PS2 27IN

## (undated) DEVICE — ELECTRODE BLADE INSULATED 1 IN

## (undated) DEVICE — RETRACTOR TESSA HANDHELD

## (undated) DEVICE — GOWN X-LG STERILE BACK

## (undated) DEVICE — SUT SILK 3-0 BLK BR SH 30IN

## (undated) DEVICE — COVER PROBE WITH BAND 6X96IN

## (undated) DEVICE — GLOVE PROTEXIS PI SYN SURG 6.0

## (undated) DEVICE — DRAPE FLUID WARMER ORS 44X44IN

## (undated) DEVICE — ADHESIVE DERMABOND ADVANCED

## (undated) DEVICE — GLOVE 6.0 PROTEXIS PI MICRO

## (undated) DEVICE — PENCIL SMOKE EVAC TELSCP 15FT

## (undated) DEVICE — CONTAINER SPECIMEN SCREW 4OZ

## (undated) DEVICE — SUT STRATAFIX 4-0 30CM PS-2

## (undated) DEVICE — CHARM MARGINMAP SPEC 5MM

## (undated) DEVICE — SYR 10CC LUER LOCK

## (undated) DEVICE — Device

## (undated) DEVICE — SOL IRRI STRL WATER 1000ML